# Patient Record
Sex: MALE | Race: WHITE | NOT HISPANIC OR LATINO | Employment: OTHER | URBAN - METROPOLITAN AREA
[De-identification: names, ages, dates, MRNs, and addresses within clinical notes are randomized per-mention and may not be internally consistent; named-entity substitution may affect disease eponyms.]

---

## 2019-12-09 ENCOUNTER — APPOINTMENT (EMERGENCY)
Dept: RADIOLOGY | Facility: HOSPITAL | Age: 69
End: 2019-12-09
Payer: MEDICARE

## 2019-12-09 ENCOUNTER — HOSPITAL ENCOUNTER (EMERGENCY)
Facility: HOSPITAL | Age: 69
Discharge: HOME | End: 2019-12-09
Attending: EMERGENCY MEDICINE
Payer: MEDICARE

## 2019-12-09 VITALS
SYSTOLIC BLOOD PRESSURE: 145 MMHG | DIASTOLIC BLOOD PRESSURE: 90 MMHG | WEIGHT: 298 LBS | HEART RATE: 78 BPM | OXYGEN SATURATION: 98 % | BODY MASS INDEX: 44.14 KG/M2 | TEMPERATURE: 98.1 F | RESPIRATION RATE: 18 BRPM | HEIGHT: 69 IN

## 2019-12-09 DIAGNOSIS — S91.115A: ICD-10-CM
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
## 2019-12-09 PROCEDURE — 12002 RPR S/N/AX/GEN/TRNK2.6-7.5CM: CPT | Performed by: EMERGENCY MEDICINE

## 2019-12-09 PROCEDURE — 73630 X-RAY EXAM OF FOOT: CPT

## 2019-12-09 PROCEDURE — 99283 EMERGENCY DEPT VISIT LOW MDM: CPT

## 2019-12-09 PROCEDURE — 99284 EMERGENCY DEPT VISIT MOD MDM: CPT | Performed by: EMERGENCY MEDICINE

## 2019-12-09 RX ORDER — LIDOCAINE HYDROCHLORIDE 20 MG/ML
10 INJECTION, SOLUTION EPIDURAL; INFILTRATION; INTRACAUDAL; PERINEURAL ONCE
Status: COMPLETED | OUTPATIENT
Start: 2019-12-09 | End: 2019-12-09

## 2019-12-09 RX ORDER — LIDOCAINE HYDROCHLORIDE 20 MG/ML
5 INJECTION, SOLUTION EPIDURAL; INFILTRATION; INTRACAUDAL; PERINEURAL ONCE
Status: COMPLETED | OUTPATIENT
Start: 2019-12-09 | End: 2019-12-09

## 2019-12-09 RX ORDER — CEPHALEXIN 250 MG/1
500 CAPSULE ORAL ONCE
Status: COMPLETED | OUTPATIENT
Start: 2019-12-09 | End: 2019-12-09

## 2019-12-09 RX ORDER — CEPHALEXIN 500 MG/1
500 CAPSULE ORAL 4 TIMES DAILY
Qty: 40 CAPSULE | Refills: 0 | Status: SHIPPED | OUTPATIENT
Start: 2019-12-09 | End: 2019-12-19

## 2019-12-09 RX ADMIN — LIDOCAINE HYDROCHLORIDE 5 ML: 20 INJECTION, SOLUTION EPIDURAL; INFILTRATION; INTRACAUDAL; PERINEURAL at 18:11

## 2019-12-09 RX ADMIN — CEPHALEXIN 500 MG: 250 CAPSULE ORAL at 20:06

## 2019-12-09 RX ADMIN — LIDOCAINE HYDROCHLORIDE 10 ML: 20 INJECTION, SOLUTION EPIDURAL; INFILTRATION; INTRACAUDAL; PERINEURAL at 19:42

## 2019-12-09 NOTE — ED PROVIDER NOTES
H&P Exam - Trauma   Sridevi Faria 77 y o  male MRN: [id number]  Unit/Bed#: FT 01/FT 01 Encounter: [id number]    Assessment/Plan   Trauma Alert:    Model of Arrival:   via    Trauma Team: Current Providers  Attending Provider: Alysia Del Toro DO  Registered Nurse: Lay Galarza, RN  Registered Nurse: Akash Vazquez, RN  Registered Nurse: Michael Guevara RN  Consultants: None    Trauma Active Problems: *Laceration to foot/L index finger**    Trauma Plan: X-Rays/Sutures    Chief Complaint:   Chief Complaint   Patient presents with    Fall     Patient c/o fall and has left foot laceration  and left index finger laceration  Patient denies any head injury  Patient denies loss of consciousness  Patient does not take blood thinners  History of Present Illness   HPI:  Sridevi Faria is a 77 y o  male who presents with chief complaint of cutting his foot after falling down carpeted stairs around 1:30 p m  Patient has a laceration on the plantar crease of his 4th & 5th toes  Pt  Also has a cut to his L index finger  Mechanism:           HPI  Review of Systems   Constitutional: Negative for diaphoresis, fatigue and fever  HENT: Negative for congestion, ear pain, nosebleeds and sore throat  Eyes: Negative for photophobia, pain, discharge and visual disturbance  Respiratory: Negative for cough, choking, chest tightness, shortness of breath and wheezing  Cardiovascular: Negative for chest pain and palpitations  Gastrointestinal: Negative for abdominal distention, abdominal pain, diarrhea and vomiting  Genitourinary: Negative for dysuria, flank pain and frequency  Musculoskeletal: Negative for arthralgias, back pain, gait problem, joint swelling, myalgias and neck pain  Skin: Positive for wound  Negative for color change and rash  Neurological: Negative for dizziness, syncope and headaches  Psychiatric/Behavioral: Negative for behavioral problems and confusion   The patient is not nervous/anxious  All other systems reviewed and are negative  Historical Information     Immunizations: There is no immunization history on file for this patient  Past Medical History:   Diagnosis Date    Cancer [facility])     lymphoma    Cardiac disease     pacemaker    Hyperlipidemia     Hypertension      History reviewed  No pertinent family history    Past Surgical History:   Procedure Laterality Date    CARDIAC PACEMAKER PLACEMENT         Social History     Socioeconomic History    Marital status: /Civil Union     Spouse name: None    Number of children: None    Years of education: None    Highest education level: None   Occupational History    None   Social Needs    Financial resource strain: None    Food insecurity:     Worry: None     Inability: None    Transportation needs:     Medical: None     Non-medical: None   Tobacco Use    Smoking status: Never Smoker    Smokeless tobacco: Never Used   Substance and Sexual Activity    Alcohol use: Never     Frequency: Never    Drug use: Never    Sexual activity: None   Lifestyle    Physical activity:     Days per week: None     Minutes per session: None    Stress: None   Relationships    Social connections:     Talks on phone: None     Gets together: None     Attends Mandaen service: None     Active member of club or organization: None     Attends meetings of clubs or organizations: None     Relationship status: None    Intimate partner violence:     Fear of current or ex partner: None     Emotionally abused: None     Physically abused: None     Forced sexual activity: None   Other Topics Concern    None   Social History Narrative    None       Family History: non-contributory    Meds/Allergies   None       No Known Allergies    PHYSICAL EXAM    PE limited by: nothing    Objective   Vitals:   First set: Temperature: 98 1 °F (36 7 °C) (12/09/19 1711)  Pulse: 70 (12/09/19 1711)  Respirations: 16 (12/09/19 1711)  Blood Pressure: 140/83 (12/09/19 1711)  SpO2: 100 % (12/09/19 1711)    Primary Survey:   (A) Airway: patent  (B) Breathing: CTA  (C) Circulation: Pulses:   normal  (D) Disabliity:  GCS Total:  15  (E) Expose:  Completed    Secondary Survey: (Click on Physical Exam tab above)  Physical Exam   Constitutional: He is oriented to person, place, and time  He appears well-developed and well-nourished  71-year-old male lying on the stretcher in no acute distress  HENT:   Head: Normocephalic and atraumatic  Eyes: Pupils are equal, round, and reactive to light  EOM are normal    Neck: Normal range of motion  Neck supple  Cardiovascular: Normal rate  Pulmonary/Chest: Effort normal    Abdominal: Soft  Bowel sounds are normal  There is no tenderness  Musculoskeletal: Normal range of motion  Left foot:  There is a 1 inch laceration to the plantar aspect of the 4th toe at the crease of the base of the toe to the ball of the foot     Pulses are intact  There is no tenderness to palpation  There is a one inch laceration to the 5th toe on the plantar aspect at the crease of the toe to the ball of the foot as well  L index finger:  Small avulsion to the distal tip of the skin; I offered pt  A suture there, but pt  Just wanted me to cut the skin and part of the nail off which I did  He declined sutures  Pt  Had full ROM and tendons were intact   Neurological: He is alert and oriented to person, place, and time  No cranial nerve deficit  He exhibits normal muscle tone  Coordination normal    Skin: Skin is warm and dry  There is a 1 inch laceration to the plantar aspect of the base of the 4th toe    There is a 1 inch lac to the plantar aspect of the base of the 5th toe    There is an avulsion lac to the distal L 2nd digit   Psychiatric: He has a normal mood and affect  Nursing note and vitals reviewed        Invasive Devices     None                 Lab Results:   Results Reviewed     None                 Imaging Studies:   Direct to CT: No  XR foot 3+ views LEFT   ED Interpretation by Hoang Oh DO (12/09 3740)   Neg for Fx          Other Studies: X-Rays  No fx  Code Status: No Order  Advance Directive and Living Will:      Power of :    POLST:      Procedures  Laceration repair  Date/Time: 12/9/2019 8:16 PM  Performed by: Hoang Oh DO  Authorized by: Hoang Oh DO   Consent: Verbal consent obtained  Consent given by: patient  Patient understanding: patient states understanding of the procedure being performed  Patient identity confirmed: verbally with patient  Body area: lower extremity  Location details: left foot  Laceration length: 5 cm  Foreign bodies: no foreign bodies  Tendon involvement: none  Nerve involvement: none  Vascular damage: no  Anesthesia: local infiltration    Anesthesia:  Local Anesthetic: lidocaine 2% without epinephrine  Anesthetic total: 10 mL    Sedation:  Patient sedated: no      Wound Dehiscence:    Secondary closure or dehiscence: complex    Procedure Details:  Irrigation solution: saline  Irrigation method: syringe  Amount of cleaning: standard  Debridement: none  Degree of undermining: none  Skin closure: 4-0 nylon  Number of sutures: 5  Technique: complex  Approximation: close  Approximation difficulty: complex  Dressing: non-adhesive packing strip, 4x4 sterile gauze and tube gauze  Patient tolerance: Patient tolerated the procedure well with no immediate complications  Comments: This was a very complex repair  Patient had 2 lacerations,  one at the crease of the plantar aspect of the 4th toe to the ball of the foot that was about an inch in length and also required( 3) - 4'0 Ethilon sutures  Patient also had a 1 inch laceration to the plantar aspect of the crease of the 5th toe which required 2 (4'-0) sutures     The lacerations were in a very awkward spot and it took about an hour to suture  JAQUELINE Jay assisted me with my repair                 ED Course MDM     Differential diagnosis includes:  1  Fall  2  Fall down stairs  3  Left foot laceration  4  Left toe laceration  5  Left 2nd digit injury  6  Left 2nd digit abrasion           Disposition  [facility] Transfer: No  Final diagnoses:   Fall   Laceration of fifth toe of left foot   Laceration of fourth toe, left, initial encounter     Time reflects when diagnosis was documented in both MDM as applicable and the Disposition within this note     Time User Action Codes Description Comment    12/9/2019  8:02 PM Luis M Acevedo Add [G63  XXXA] Fall     12/9/2019  8:03 PM Luis M Acevedo Add [R41 692M] Laceration of fifth toe of left foot     12/9/2019  8:04 PM Luis M Acevedo Add [S91 115A] Laceration of fourth toe, left, initial encounter       ED Disposition     ED Disposition Condition Date/Time Comment    Discharge Good Mon Dec 9, 2019  8:02 PM Jai Hudson discharge to home/self care  Follow-up Information     Follow up With Specialties Details Why Contact Info    Emergency [address]  In 12 days You have 3 sutures in your 4th toe and 2 sutures in the 5th toe         Discharge Medication List as of 12/9/2019  8:10 PM      START taking these medications    Details   cephalexin (KEFLEX) 500 mg capsule Take 1 capsule (500 mg total) by mouth 4 (four) times a day for 10 days, Starting Mon 12/9/2019, Until Thu 12/19/2019, Print           No discharge procedures on file        ED Provider  Electronically Signed by         Bharti Vincent DO  12/09/19 2031       Bharti Vincent,   12/09/19 2031

## 2019-12-10 NOTE — DISCHARGE INSTRUCTIONS
Keep clean and dry x 24 hours  2  Take your Keflex 4 times a day for 10 days  3  Elevate your foot as much as possible  4  Return immediately with any signs of infection, pus drainage, fever,chills, etc   5   Take Tylenol 650mg every 4 hours for pain  6  Suture removal in 12 days  7    Return if any problems

## 2019-12-12 NOTE — RESULT ENCOUNTER NOTE
Patient was notified of radiology read of fracture  He reports absolutely no pain anymore, reports feeling completely back to normal even w weight bearing  Did provide podiatry number for follow up as there is an obvious deformity in the bone  Unknown if prior old fx

## 2020-03-10 ENCOUNTER — APPOINTMENT (OUTPATIENT)
Dept: LAB | Facility: HOSPITAL | Age: 70
End: 2020-03-10
Payer: MEDICARE

## 2020-03-10 ENCOUNTER — TRANSCRIBE ORDERS (OUTPATIENT)
Dept: ADMINISTRATIVE | Facility: HOSPITAL | Age: 70
End: 2020-03-10

## 2020-03-10 DIAGNOSIS — R05.9 COUGH: ICD-10-CM

## 2020-03-10 DIAGNOSIS — R06.02 SHORTNESS OF BREATH: Primary | ICD-10-CM

## 2020-03-10 DIAGNOSIS — R06.02 SHORTNESS OF BREATH: ICD-10-CM

## 2020-03-10 LAB
BASOPHILS # BLD AUTO: 0.06 THOUSANDS/ΜL (ref 0–0.1)
BASOPHILS NFR BLD AUTO: 1 % (ref 0–1)
EOSINOPHIL # BLD AUTO: 0.12 THOUSAND/ΜL (ref 0–0.61)
EOSINOPHIL NFR BLD AUTO: 2 % (ref 0–6)
ERYTHROCYTE [DISTWIDTH] IN BLOOD BY AUTOMATED COUNT: 14 % (ref 11.6–15.1)
HCT VFR BLD AUTO: 47.2 % (ref 36.5–49.3)
HGB BLD-MCNC: 16 G/DL (ref 12–17)
IMM GRANULOCYTES # BLD AUTO: 0.06 THOUSAND/UL (ref 0–0.2)
IMM GRANULOCYTES NFR BLD AUTO: 1 % (ref 0–2)
LYMPHOCYTES # BLD AUTO: 1.48 THOUSANDS/ΜL (ref 0.6–4.47)
LYMPHOCYTES NFR BLD AUTO: 19 % (ref 14–44)
MCH RBC QN AUTO: 29.9 PG (ref 26.8–34.3)
MCHC RBC AUTO-ENTMCNC: 33.9 G/DL (ref 31.4–37.4)
MCV RBC AUTO: 88 FL (ref 82–98)
MONOCYTES # BLD AUTO: 0.46 THOUSAND/ΜL (ref 0.17–1.22)
MONOCYTES NFR BLD AUTO: 6 % (ref 4–12)
NEUTROPHILS # BLD AUTO: 5.62 THOUSANDS/ΜL (ref 1.85–7.62)
NEUTS SEG NFR BLD AUTO: 71 % (ref 43–75)
NRBC BLD AUTO-RTO: 0 /100 WBCS
PLATELET # BLD AUTO: 208 THOUSANDS/UL (ref 149–390)
PMV BLD AUTO: 12.5 FL (ref 8.9–12.7)
RBC # BLD AUTO: 5.35 MILLION/UL (ref 3.88–5.62)
WBC # BLD AUTO: 7.8 THOUSAND/UL (ref 4.31–10.16)

## 2020-03-10 PROCEDURE — 85025 COMPLETE CBC W/AUTO DIFF WBC: CPT

## 2020-03-10 PROCEDURE — 86003 ALLG SPEC IGE CRUDE XTRC EA: CPT

## 2020-03-10 PROCEDURE — 82785 ASSAY OF IGE: CPT

## 2020-03-10 PROCEDURE — 36415 COLL VENOUS BLD VENIPUNCTURE: CPT

## 2020-03-12 LAB
A ALTERNATA IGE QN: <0.1 KUA/I
A FUMIGATUS IGE QN: <0.1 KUA/I
ALLERGEN COMMENT: NORMAL
ALLERGEN COMMENT: NORMAL
ALMOND IGE QN: <0.1 KUA/I
BERMUDA GRASS IGE QN: <0.1 KUA/I
BOXELDER IGE QN: <0.1 KUA/I
C HERBARUM IGE QN: <0.1 KUA/I
CASHEW NUT IGE QN: <0.1 KUA/I
CAT DANDER IGE QN: <0.1 KUA/I
CMN PIGWEED IGE QN: <0.1 KUA/I
CODFISH IGE QN: <0.1 KUA/I
COMMON RAGWEED IGE QN: <0.1 KUA/I
COTTONWOOD IGE QN: <0.1 KUA/I
D FARINAE IGE QN: <0.1 KUA/I
D PTERONYSS IGE QN: <0.1 KUA/I
DOG DANDER IGE QN: <0.1 KUA/I
EGG WHITE IGE QN: <0.1 KUA/I
GLUTEN IGE QN: <0.1 KUA/I
HAZELNUT IGE QN: <0.1 KUA/L
LONDON PLANE IGE QN: <0.1 KUA/I
MILK IGE QN: <0.1 KUA/I
MOUSE URINE PROT IGE QN: <0.1 KUA/I
MT JUNIPER IGE QN: <0.1 KUA/I
MUGWORT IGE QN: <0.1 KUA/I
P NOTATUM IGE QN: <0.1 KUA/I
PEANUT IGE QN: <0.1 KUA/I
ROACH IGE QN: <0.1 KUA/I
SALMON IGE QN: <0.1 KUA/I
SCALLOP IGE QN: <0.1 KUA/L
SESAME SEED IGE QN: <0.1 KUA/I
SHEEP SORREL IGE QN: <0.1 KUA/I
SHRIMP IGE QN: <0.1 KUA/L
SILVER BIRCH IGE QN: <0.1 KUA/I
SOYBEAN IGE QN: <0.1 KUA/I
TIMOTHY IGE QN: <0.1 KUA/I
TOTAL IGE SMQN RAST: 2.86 KU/L (ref 0–113)
TOTAL IGE SMQN RAST: 2.99 KU/L (ref 0–113)
TUNA IGE QN: <0.1 KUA/I
WALNUT IGE QN: <0.1 KUA/I
WALNUT IGE QN: <0.1 KUA/I
WHEAT IGE QN: <0.1 KUA/I
WHITE ASH IGE QN: <0.1 KUA/I
WHITE ELM IGE QN: <0.1 KUA/I
WHITE MULBERRY IGE QN: <0.1 KUA/I
WHITE OAK IGE QN: <0.1 KUA/I

## 2021-03-10 DIAGNOSIS — Z23 ENCOUNTER FOR IMMUNIZATION: ICD-10-CM

## 2021-06-14 ENCOUNTER — HOSPITAL ENCOUNTER (EMERGENCY)
Facility: HOSPITAL | Age: 71
Discharge: HOME | End: 2021-06-14
Attending: EMERGENCY MEDICINE | Admitting: EMERGENCY MEDICINE
Payer: MEDICARE

## 2021-06-14 VITALS
RESPIRATION RATE: 18 BRPM | BODY MASS INDEX: 43.2 KG/M2 | SYSTOLIC BLOOD PRESSURE: 142 MMHG | WEIGHT: 291.67 LBS | TEMPERATURE: 98.5 F | OXYGEN SATURATION: 98 % | HEART RATE: 60 BPM | DIASTOLIC BLOOD PRESSURE: 86 MMHG | HEIGHT: 69 IN

## 2021-06-14 DIAGNOSIS — H60.90 OTITIS EXTERNA: Primary | ICD-10-CM

## 2021-06-14 PROCEDURE — 99283 EMERGENCY DEPT VISIT LOW MDM: CPT

## 2021-06-14 PROCEDURE — 99284 EMERGENCY DEPT VISIT MOD MDM: CPT | Performed by: EMERGENCY MEDICINE

## 2021-06-14 RX ORDER — NEOMYCIN SULFATE, POLYMYXIN B SULFATE AND HYDROCORTISONE 10; 3.5; 1 MG/ML; MG/ML; [USP'U]/ML
4 SUSPENSION/ DROPS AURICULAR (OTIC) ONCE
Status: COMPLETED | OUTPATIENT
Start: 2021-06-14 | End: 2021-06-14

## 2021-06-14 RX ADMIN — NEOMYCIN SULFATE, POLYMYXIN B SULFATE AND HYDROCORTISONE 4 DROP: 10; 3.5; 1 SUSPENSION/ DROPS AURICULAR (OTIC) at 11:08

## 2021-06-14 NOTE — DISCHARGE INSTRUCTIONS
Four drops of the solution should be instilled into the affected ear 3 times daily for the next 7 days  Lie with the affected ear upward and then the drops should be instilled  This position should be maintained for 5 minutes to facilitate penetration of the drops into the ear canal  Please follow up with ENT for recheck   No swimming until healed

## 2021-06-14 NOTE — ED PROVIDER NOTES
History  Chief Complaint   Patient presents with    Earache     per patient he has been having ear pain for one week on the right side  HPI  81 yo M presents with right ear pain for the past week  He states he was swimming in the pool frequently recently and has pain to the outside of ear which is aching and moderate  Has not noticed any drainage  States he has chronic ringing sensation in both ears and has not followed up for this  No dizziness or headache  None       Past Medical History:   Diagnosis Date    Cancer [facility])     lymphoma    Cardiac disease     pacemaker    Hyperlipidemia     Hypertension        Past Surgical History:   Procedure Laterality Date    CARDIAC PACEMAKER PLACEMENT         History reviewed  No pertinent family history  I have reviewed and agree with the history as documented  E-Cigarette/Vaping     E-Cigarette/Vaping Substances     Social History     Tobacco Use    Smoking status: Never Smoker    Smokeless tobacco: Never Used   Substance Use Topics    Alcohol use: Never    Drug use: Never       Review of Systems   Constitutional: Negative for chills and fever  HENT: Positive for ear pain  Negative for dental problem  Eyes: Negative for pain and redness  Respiratory: Negative for cough and shortness of breath  Cardiovascular: Negative for chest pain and palpitations  Gastrointestinal: Negative for abdominal pain and nausea  Endocrine: Negative for polydipsia and polyphagia  Genitourinary: Negative for dysuria and frequency  Musculoskeletal: Negative for arthralgias and joint swelling  Skin: Negative for color change and rash  Neurological: Negative for dizziness and headaches  Psychiatric/Behavioral: Negative for behavioral problems and confusion  All other systems reviewed and are negative  Physical Exam  Physical Exam  Vitals and nursing note reviewed  Constitutional:       General: He is not in acute distress    HENT:      Head: Normocephalic and atraumatic  Comments: +tenderness with manipulation of pinna on R, mild edema of canal, TM clear     Left Ear: External ear normal       Nose: Nose normal    Eyes:      General: No scleral icterus  Cardiovascular:      Rate and Rhythm: Normal rate  Pulmonary:      Effort: Pulmonary effort is normal  No respiratory distress  Abdominal:      General: There is no distension  Musculoskeletal:         General: No deformity  Normal range of motion  Skin:     Findings: No rash  Neurological:      General: No focal deficit present  Mental Status: He is alert and oriented to person, place, and time  Cranial Nerves: No cranial nerve deficit  Sensory: No sensory deficit  Motor: No weakness  Gait: Gait normal    Psychiatric:         Mood and Affect: Mood normal          Vital Signs  ED Triage Vitals [06/14/21 1050]   Temperature Pulse Respirations Blood Pressure SpO2   98 5 °F (36 9 °C) 61 18 170/93 99 %      Temp Source Heart Rate Source Patient Position - Orthostatic VS BP Location FiO2 (%)   Oral Monitor Lying Right arm --      Pain Score       7           Vitals:    06/14/21 1050 06/14/21 1100   BP: 170/93 142/86   Pulse: 61 60   Patient Position - Orthostatic VS: Lying Lying         Visual Acuity      ED Medications  Medications   neomycin-polymyxin-hydrocortisone (CORTISPORIN) 0 35%-10,000 units/mL-1% otic suspension 4 drop (4 drops Right Ear Given 6/14/21 1108)       Diagnostic Studies  Results Reviewed     None                 No orders to display              Procedures  Procedures         ED Course                             SBIRT 22yo+      Most Recent Value   SBIRT (24 yo +)   In order to provide better care to our patients, we are screening all of our patients for alcohol and drug use  Would it be okay to ask you these screening questions? No Filed at: 06/14/2021 1105                    MDM  81 yo M presents with otitis externa from swimming   No signs of necrotizing infection or mastoiditis  Will rx antibiotic drops and follow up with ENT  Disposition  Final diagnoses:   Otitis externa     Time reflects when diagnosis was documented in both MDM as applicable and the Disposition within this note     Time User Action Codes Description Comment    6/14/2021 11:03 AM Dario Bello Add [H60 90] Otitis externa       ED Disposition     ED Disposition Condition Date/Time Comment    Discharge Stable Mon Jun 14, 2021 11:03 AM Graham Im Refai discharge to home/self care  Follow-up Information     Follow up With Specialties Details Why Contact Info    Daniela Eugene MD Otolaryngology Schedule an appointment as soon as possible for a visit   [address] [place] [id number]  [phone]            Patient's Medications    No medications on file     No discharge procedures on file      PDMP Review     None          ED Provider  Electronically Signed by           Tia Noguera MD  06/14/21 7240

## 2022-03-05 ENCOUNTER — HOSPITAL ENCOUNTER (EMERGENCY)
Facility: HOSPITAL | Age: 72
Discharge: HOME | End: 2022-03-05
Attending: EMERGENCY MEDICINE | Admitting: EMERGENCY MEDICINE
Payer: MEDICARE

## 2022-03-05 ENCOUNTER — APPOINTMENT (EMERGENCY)
Dept: VASCULAR ULTRASOUND | Facility: HOSPITAL | Age: 72
End: 2022-03-05
Payer: MEDICARE

## 2022-03-05 ENCOUNTER — APPOINTMENT (EMERGENCY)
Dept: RADIOLOGY | Facility: HOSPITAL | Age: 72
End: 2022-03-05
Payer: MEDICARE

## 2022-03-05 VITALS
SYSTOLIC BLOOD PRESSURE: 121 MMHG | HEART RATE: 60 BPM | BODY MASS INDEX: 42.58 KG/M2 | DIASTOLIC BLOOD PRESSURE: 73 MMHG | TEMPERATURE: 98.8 F | OXYGEN SATURATION: 97 % | RESPIRATION RATE: 17 BRPM | WEIGHT: 287.48 LBS | HEIGHT: 69 IN

## 2022-03-05 DIAGNOSIS — M79.89 SWELLING OF CALF: Primary | ICD-10-CM

## 2022-03-05 DIAGNOSIS — M79.672 LEFT FOOT PAIN: ICD-10-CM

## 2022-03-05 LAB
ATRIAL RATE: 192 BPM
QRS AXIS: -40 DEGREES
QRSD INTERVAL: 146 MS
QT INTERVAL: 448 MS
QTC INTERVAL: 448 MS
T WAVE AXIS: 19 DEGREES
VENTRICULAR RATE: 60 BPM

## 2022-03-05 PROCEDURE — 99284 EMERGENCY DEPT VISIT MOD MDM: CPT

## 2022-03-05 PROCEDURE — 93971 EXTREMITY STUDY: CPT

## 2022-03-05 PROCEDURE — 93005 ELECTROCARDIOGRAM TRACING: CPT

## 2022-03-05 PROCEDURE — 93971 EXTREMITY STUDY: CPT | Performed by: SURGERY

## 2022-03-05 PROCEDURE — 99284 EMERGENCY DEPT VISIT MOD MDM: CPT | Performed by: PHYSICIAN ASSISTANT

## 2022-03-05 PROCEDURE — 73630 X-RAY EXAM OF FOOT: CPT

## 2022-03-05 PROCEDURE — 93010 ELECTROCARDIOGRAM REPORT: CPT | Performed by: INTERNAL MEDICINE

## 2022-03-05 NOTE — DISCHARGE INSTRUCTIONS
Elevate your leg and wear compression stockings to help with swelling  Please follow-up with podiatry and your family doctor  Return to the ER with any worsening symptoms

## 2022-03-05 NOTE — ED PROVIDER NOTES
History  Chief Complaint   Patient presents with    Leg Swelling     Patient co left leg/calf swelling  Symptoms started 3 months ago  68yo male with a history of lymphoma currently in remission, atrial flutter, pacemaker, hypertension, hyperlipidemia, and type 2 diabetes presenting for evaluation of multiple complaints  First, he complains of left calf swelling that has been present for the past month  He denies any injuries but states his left calf feels larger than the right with associated fullness  He denies any calf pain  Patient also reports pain in his left 4th and 5th metatarsal region that has been persistent for the past 3 months since a fall  Additionally, patient has a cracked skin at his left heel that causes pain with ambulation  Patient denies any chest pain or shortness of breath  He is otherwise asymptomatic  History provided by:  Patient   used: No    Medical Problem  Location:  Left calf  Quality:  Swelling  Severity:  Moderate  Onset quality:  Gradual  Duration:  1 month  Timing:  Constant  Progression:  Unchanged  Chronicity:  New  Context:  No injuries  Relieved by:  Nothing  Worsened by:  Nothing  Associated symptoms: no abdominal pain, no chest pain, no fever, no loss of consciousness, no rash, no shortness of breath and no vomiting        None       Past Medical History:   Diagnosis Date    Cancer [facility])     lymphoma    Cardiac disease     pacemaker    Hyperlipidemia     Hypertension        Past Surgical History:   Procedure Laterality Date    CARDIAC PACEMAKER PLACEMENT         History reviewed  No pertinent family history  I have reviewed and agree with the history as documented      E-Cigarette/Vaping     E-Cigarette/Vaping Substances     Social History     Tobacco Use    Smoking status: Never Smoker    Smokeless tobacco: Never Used   Substance Use Topics    Alcohol use: Never    Drug use: Never       Review of Systems   Constitutional: Negative for chills and fever  HENT: Negative for drooling and voice change  Eyes: Negative for discharge and redness  Respiratory: Negative for shortness of breath and stridor  Cardiovascular: Positive for leg swelling  Negative for chest pain  Gastrointestinal: Negative for abdominal pain and vomiting  Musculoskeletal: Positive for arthralgias  Negative for neck pain and neck stiffness  Skin: Positive for wound  Negative for color change and rash  Neurological: Negative for seizures, loss of consciousness and syncope  Psychiatric/Behavioral: Negative for confusion  The patient is not nervous/anxious  All other systems reviewed and are negative  Physical Exam  Physical Exam  Vitals and nursing note reviewed  Constitutional:       General: He is not in acute distress  Appearance: Normal appearance  He is not toxic-appearing  HENT:      Head: Normocephalic and atraumatic  Right Ear: External ear normal       Left Ear: External ear normal    Eyes:      General: No scleral icterus  Right eye: No discharge  Left eye: No discharge  Conjunctiva/sclera: Conjunctivae normal    Cardiovascular:      Rate and Rhythm: Normal rate and regular rhythm  Pulses:           Dorsalis pedis pulses are 2+ on the left side  Pulmonary:      Effort: Pulmonary effort is normal  No respiratory distress  Breath sounds: Normal breath sounds  No stridor  No wheezing or rales  Musculoskeletal:         General: No deformity  Normal range of motion  Cervical back: Normal range of motion  Feet:       Comments: Left lower leg: There is a palpable fullness to the left calf  No pitting edema  No bony tenderness in lower leg  No skin changes  2+ DP pulse  Neurological:      General: No focal deficit present  Mental Status: He is alert  Mental status is at baseline     Psychiatric:         Mood and Affect: Mood normal          Behavior: Behavior normal          Vital Signs  ED Triage Vitals   Temperature Pulse Respirations Blood Pressure SpO2   03/05/22 0815 03/05/22 0815 03/05/22 0815 03/05/22 0815 03/05/22 0815   98 8 °F (37 1 °C) 64 21 (!) 188/86 99 %      Temp Source Heart Rate Source Patient Position - Orthostatic VS BP Location FiO2 (%)   03/05/22 0815 03/05/22 0815 03/05/22 0815 03/05/22 0815 --   Oral Monitor Sitting Right arm       Pain Score       03/05/22 1015       No Pain           Vitals:    03/05/22 0815 03/05/22 1015   BP: (!) 188/86 121/73   Pulse: 64 60   Patient Position - Orthostatic VS: Sitting          Visual Acuity      ED Medications  Medications - No data to display    Diagnostic Studies  Results Reviewed     None                 XR foot 3+ views LEFT   ED Interpretation by Nadeen Lai PA-C (03/05 2891)   No acute fracture      Final Result by Alisa Medina MD (03/05 1010)      No acute osseous abnormality  Workstation performed: [id number]         VAS lower limb venous duplex study, unilateral/limited    (Results Pending)              Procedures  Procedures         ED Course  ED Course as of 03/05/22 1052   Sat Mar 05, 2022   0827 Vascular tech notified of venous duplex order  1007 Informed by vascular tech that venous duplex is negative for DVT  Identification of Seniors at Risk      Most Recent Value   (ISAR) Identification of Seniors at Risk    Before the illness or injury that brought you to the Emergency, did you need someone to help you on a regular basis? 0 Filed at: 03/05/2022 0814   In the last 24 hours, have you needed more help than usual? 0 Filed at: 03/05/2022 6465   Have you been hospitalized for one or more nights during the past 6 months? 0 Filed at: 03/05/2022 9772   In general, do you see well? 0 Filed at: 03/05/2022 3125   In general, do you have serious problems with your memory? 0 Filed at: 03/05/2022 0565   Do you take more than three different medications every day?  1 Filed at: 03/05/2022 9122 ISAR Score 1 Filed at: 03/05/2022 6311                          [facility]  Number of Diagnoses or Management Options  Left foot pain: new and requires workup  Swelling of calf: new and requires workup  Diagnosis management comments: 68yo male presenting for left calf swelling x 1 month as well as left foot pain x 3 months after a fall  No CP/SOB  He is hypertensive on arrival with otherwise normal vital signs  On exam, he does have a fullness in his left calf without pitting edema  LLE is neurovascularly intact  X-rays of left foot obtained which are negative for fracture  Venous duplex obtained which is negative for DVT  No indication for further workup at this time  Advised elevation and compression stockings  Advised close PCP and podiatry follow-up  ED return precautions discussed  Patient expressed understanding and is agreeable to plan  Patient discharged in stable condition  Amount and/or Complexity of Data Reviewed  Tests in the radiology section of CPT®: reviewed and ordered  Discussion of test results with the performing providers: yes  Independent visualization of images, tracings, or specimens: yes    Risk of Complications, Morbidity, and/or Mortality  Presenting problems: moderate  Diagnostic procedures: low  Management options: low    Patient Progress  Patient progress: stable      Disposition  Final diagnoses:   Swelling of calf   Left foot pain     Time reflects when diagnosis was documented in both MDM as applicable and the Disposition within this note     Time User Action Codes Description Comment    3/5/2022 10:13 AM [place], [address] [employer] Add [M79 89] Swelling of calf     3/5/2022 10:13 AM Matthew, [address] [employer] Oren Add [V67 253] Left foot pain       ED Disposition     ED Disposition Condition Date/Time Comment    Discharge Stable Sat Mar 5, 2022 10:12 AM Jean Claude Ear Refai discharge to home/self care              Follow-up Information     Follow up With Specialties Details Why Contact Info Additional [address] [place] Nadeem Mix DPM Podiatry Schedule an appointment as soon as possible for a visit   [address]  [address]       5324 [facility] Emergency Department Emergency Medicine  If symptoms worsen 34 [facility] 109 [facility] Emergency Department, [address] [place] [address], [facility]          There are no discharge medications for this patient  No discharge procedures on file      PDMP Review     None          ED Provider  Electronically Signed by           Gabriela Seymour PA-C  03/05/22 9013

## 2022-08-29 ENCOUNTER — HOSPITAL ENCOUNTER (EMERGENCY)
Facility: HOSPITAL | Age: 72
Discharge: HOME | End: 2022-08-29
Attending: EMERGENCY MEDICINE
Payer: MEDICARE

## 2022-08-29 ENCOUNTER — APPOINTMENT (OUTPATIENT)
Dept: RADIOLOGY | Facility: HOSPITAL | Age: 72
End: 2022-08-29
Payer: MEDICARE

## 2022-08-29 VITALS
HEART RATE: 78 BPM | DIASTOLIC BLOOD PRESSURE: 74 MMHG | SYSTOLIC BLOOD PRESSURE: 153 MMHG | RESPIRATION RATE: 18 BRPM | TEMPERATURE: 97.7 F | OXYGEN SATURATION: 99 %

## 2022-08-29 DIAGNOSIS — M79.671 RIGHT FOOT PAIN: Primary | ICD-10-CM

## 2022-08-29 PROCEDURE — 73620 X-RAY EXAM OF FOOT: CPT

## 2022-08-29 PROCEDURE — 99282 EMERGENCY DEPT VISIT SF MDM: CPT | Performed by: PHYSICIAN ASSISTANT

## 2022-08-29 PROCEDURE — 99283 EMERGENCY DEPT VISIT LOW MDM: CPT

## 2022-08-29 NOTE — ED PROVIDER NOTES
History  Chief Complaint   Patient presents with    Foreign Body in Skin     Patient co right foot pain  Patient states " I think there is either glass or wood under the skin on the bottom of my foot " Started 1 month ago  70 yo with right foot pain for one month  Gradual  Sole of right foot  Only has pain with weight bearing  No pain while resting  No rash/color change  Denies fever or chills  No numbness, tingling, or weakness  He thought there could be a foreign body in the skin but doesn't actually recall an incident where this happened  History provided by:  Patient   used: No    Leg Pain  Location:  Foot  Time since incident:  1 month  Injury: no    Foot location:  Sole of R foot  Pain details:     Quality:  Aching    Radiates to:  Does not radiate    Severity:  Mild    Onset quality:  Gradual    Duration:  1 month    Timing:  Intermittent    Progression:  Unchanged  Chronicity:  New  Dislocation: no    Relieved by:  Nothing  Worsened by:  Nothing  Ineffective treatments:  None tried  Associated symptoms: no back pain and no fever        None       Past Medical History:   Diagnosis Date    Cancer ([facility] [place] [address] )     lymphoma    Cardiac disease     pacemaker    Hyperlipidemia     Hypertension        Past Surgical History:   Procedure Laterality Date    CARDIAC PACEMAKER PLACEMENT         History reviewed  No pertinent family history  I have reviewed and agree with the history as documented  E-Cigarette/Vaping    E-Cigarette Use Never User      E-Cigarette/Vaping Substances     Social History     Tobacco Use    Smoking status: Never Smoker    Smokeless tobacco: Never Used   Vaping Use    Vaping Use: Never used   Substance Use Topics    Alcohol use: Never    Drug use: Never       Review of Systems   Constitutional: Negative for chills and fever  HENT: Negative for ear pain and sore throat  Eyes: Negative for pain and visual disturbance     Respiratory: Negative for cough and shortness of breath  Cardiovascular: Negative for chest pain and palpitations  Gastrointestinal: Negative for abdominal pain and vomiting  Genitourinary: Negative for dysuria and hematuria  Musculoskeletal: Negative for arthralgias and back pain  Skin: Negative for color change and rash  Neurological: Negative for seizures and syncope  All other systems reviewed and are negative  Physical Exam  Physical Exam  Vitals and nursing note reviewed  Constitutional:       Appearance: He is well-developed  HENT:      Head: Normocephalic and atraumatic  Eyes:      Conjunctiva/sclera: Conjunctivae normal    Cardiovascular:      Rate and Rhythm: Normal rate and regular rhythm  Heart sounds: No murmur heard  Pulmonary:      Effort: Pulmonary effort is normal  No respiratory distress  Breath sounds: Normal breath sounds  Abdominal:      Palpations: Abdomen is soft  Tenderness: There is no abdominal tenderness  Musculoskeletal:      Cervical back: Neck supple  Feet:    Skin:     General: Skin is warm and dry  Neurological:      Mental Status: He is alert           Vital Signs  ED Triage Vitals [08/29/22 1332]   Temperature Pulse Respirations Blood Pressure SpO2   97 7 °F (36 5 °C) 78 18 153/74 99 %      Temp Source Heart Rate Source Patient Position - Orthostatic VS BP Location FiO2 (%)   Tympanic Monitor Sitting Left arm --      Pain Score       --           Vitals:    08/29/22 1332   BP: 153/74   Pulse: 78   Patient Position - Orthostatic VS: Sitting         Visual Acuity      ED Medications  Medications - No data to display    Diagnostic Studies  Results Reviewed     None                 XR foot 2 views RIGHT    (Results Pending)              Procedures  Procedures         ED Course                               SBIRT 22yo+    Flowsheet Row Most Recent Value   SBIRT (25 yo +)    In order to provide better care to our patients, we are screening all of our patients for alcohol and drug use  Would it be okay to ask you these screening questions? Yes Filed at: 08/29/2022 1437   Initial Alcohol Screen: US AUDIT-C     1  How often do you have a drink containing alcohol? 0 Filed at: 08/29/2022 1437   2  How many drinks containing alcohol do you have on a typical day you are drinking? 0 Filed at: 08/29/2022 1437   3a  Male UNDER 65: How often do you have five or more drinks on one occasion? 0 Filed at: 08/29/2022 1437   3b  FEMALE Any Age, or MALE 65+: How often do you have 4 or more drinks on one occassion? 0 Filed at: 08/29/2022 1437   Audit-C Score 0 Filed at: 08/29/2022 1437   JOSE: How many times in the past year have you    Used an illegal drug or used a prescription medication for non-medical reasons? Never Filed at: 08/29/2022 1437                    MDM  Number of Diagnoses or Management Options  Right foot pain: new and requires workup  Diagnosis management comments: ddx includes but is not limited to FB, tendinitis, fasciitis, strain, sprain  Plan: XR       Amount and/or Complexity of Data Reviewed  Tests in the radiology section of CPT®: ordered and reviewed  Independent visualization of images, tracings, or specimens: yes    Risk of Complications, Morbidity, and/or Mortality  Presenting problems: low  Management options: low  General comments: 70 yo with right foot pain  XR negative  Pt understands this does not r/o FB  However with nothing visible or palpable and pt have no recollection of a FB entering the skin - alternative diagnosis is more likely  Recommended podiatry f/u  RICE  Warm soaks  Return parameters provided  Pt understands and agrees with plan        Patient Progress  Patient progress: stable      Disposition  Final diagnoses:   Right foot pain     Time reflects when diagnosis was documented in both MDM as applicable and the Disposition within this note     Time User Action Codes Description Comment    8/29/2022  2:53 PM Alvarado Crane Add [M79 5] Foreign body (FB) in soft tissue     8/29/2022  2:53 PM Horacio Lopez Remove [M79 5] Foreign body (FB) in soft tissue     8/29/2022  2:53 PM Horacio Lopez Add [I36 466] Right foot pain       ED Disposition     ED Disposition   Discharge    Condition   Stable    Date/Time   Mon Aug 29, 2022 1453    Comment   Reece Adrian Hudson discharge to home/self care  Follow-up Information     Follow up With Specialties Details Why Contact Info     Call   [address]  [address]  [phone]            Patient's Medications    No medications on file       No discharge procedures on file      PDMP Review     None          ED Provider  Electronically Signed by           Sid Johnson PA-C  08/29/22 1158

## 2023-03-01 ENCOUNTER — APPOINTMENT (EMERGENCY)
Dept: RADIOLOGY | Facility: HOSPITAL | Age: 73
End: 2023-03-01

## 2023-03-01 ENCOUNTER — HOSPITAL ENCOUNTER (EMERGENCY)
Facility: HOSPITAL | Age: 73
Discharge: HOME | End: 2023-03-01
Attending: EMERGENCY MEDICINE

## 2023-03-01 VITALS
TEMPERATURE: 97.8 F | DIASTOLIC BLOOD PRESSURE: 74 MMHG | SYSTOLIC BLOOD PRESSURE: 129 MMHG | RESPIRATION RATE: 18 BRPM | OXYGEN SATURATION: 98 % | HEART RATE: 73 BPM

## 2023-03-01 DIAGNOSIS — S92.314A CLOSED NONDISPLACED FRACTURE OF FIRST METATARSAL BONE OF RIGHT FOOT, INITIAL ENCOUNTER: Primary | ICD-10-CM

## 2023-03-01 LAB
BASOPHILS # BLD AUTO: 0.07 THOUSANDS/ÂΜL (ref 0–0.1)
BASOPHILS NFR BLD AUTO: 1 % (ref 0–1)
CRP SERPL QL: 9.1 MG/L
EOSINOPHIL # BLD AUTO: 0.12 THOUSAND/ÂΜL (ref 0–0.61)
EOSINOPHIL NFR BLD AUTO: 1 % (ref 0–6)
ERYTHROCYTE [DISTWIDTH] IN BLOOD BY AUTOMATED COUNT: 14.4 % (ref 11.6–15.1)
HCT VFR BLD AUTO: 50.3 % (ref 36.5–49.3)
HGB BLD-MCNC: 16.4 G/DL (ref 12–17)
IMM GRANULOCYTES # BLD AUTO: 0.06 THOUSAND/UL (ref 0–0.2)
IMM GRANULOCYTES NFR BLD AUTO: 1 % (ref 0–2)
LYMPHOCYTES # BLD AUTO: 2 THOUSANDS/ÂΜL (ref 0.6–4.47)
LYMPHOCYTES NFR BLD AUTO: 24 % (ref 14–44)
MCH RBC QN AUTO: 29.2 PG (ref 26.8–34.3)
MCHC RBC AUTO-ENTMCNC: 32.6 G/DL (ref 31.4–37.4)
MCV RBC AUTO: 90 FL (ref 82–98)
MONOCYTES # BLD AUTO: 0.65 THOUSAND/ÂΜL (ref 0.17–1.22)
MONOCYTES NFR BLD AUTO: 8 % (ref 4–12)
NEUTROPHILS # BLD AUTO: 5.6 THOUSANDS/ÂΜL (ref 1.85–7.62)
NEUTS SEG NFR BLD AUTO: 65 % (ref 43–75)
NRBC BLD AUTO-RTO: 0 /100 WBCS
PLATELET # BLD AUTO: 168 THOUSANDS/UL (ref 149–390)
PMV BLD AUTO: 11.2 FL (ref 8.9–12.7)
RBC # BLD AUTO: 5.62 MILLION/UL (ref 3.88–5.62)
URATE SERPL-MCNC: 8.5 MG/DL (ref 3.5–8.5)
WBC # BLD AUTO: 8.5 THOUSAND/UL (ref 4.31–10.16)

## 2023-03-01 RX ORDER — OXYCODONE HYDROCHLORIDE 5 MG/1
5 TABLET ORAL EVERY 6 HOURS PRN
Qty: 10 TABLET | Refills: 0 | Status: SHIPPED | OUTPATIENT
Start: 2023-03-01

## 2023-03-01 NOTE — PHYSICAL THERAPY NOTE
Physical Therapy Evaluation     Patient's Name: Golden Sims    Admitting Diagnosis  Foot pain [M79 673]    Problem List  There is no problem list on file for this patient  Past Medical History  Past Medical History:   Diagnosis Date    Cancer [facility])     lymphoma    Cardiac disease     pacemaker    Hyperlipidemia     Hypertension        Past Surgical History  Past Surgical History:   Procedure Laterality Date    CARDIAC PACEMAKER PLACEMENT          03/01/23 1525   PT Last Visit   PT Visit Date 03/01/23   Note Type   Note type Evaluation   Pain Assessment   Pain Assessment Tool 0-10   Pain Score No Pain   Restrictions/Precautions   Weight Bearing Precautions Per Order No   Braces or Orthoses CAM Boot   Other Precautions Fall Risk   Home Living   Type of [address] One level;Performs ADLs on one level; Able to live on main level with bedroom/bathroom  (1 TRUPTI with HR)   Bathroom Shower/Tub Walk-in shower   Bathroom Toilet Standard   Bathroom Equipment Grab bars in [address]   Additional Comments SPC in community   Prior Function   Level of [address] with functional mobility; Independent with ADLs; Independent with IADLS   Lives With Spouse;Son  (grandchildren)   Receives Help From Family   IADLs Independent with driving; Independent with meal prep; Independent with medication management   Falls in the last 6 months 0   Vocational Retired   General   Family/Caregiver Present No   Cognition   Overall Cognitive Status WFL   Arousal/Participation Alert   Orientation Level Oriented X4   Memory Within functional limits   Following Commands Follows all commands and directions without difficulty   Comments Pt agreeable to PT   RLE Assessment   RLE Assessment WFL   LLE Assessment   LLE Assessment WFL   Light Touch   RLE Light Touch Grossly intact   LLE Light Touch Grossly intact   Bed Mobility   Supine to Sit 6  Modified independent   Additional items HOB elevated; Bedrails Transfers   Sit to Stand 6  Modified independent   Additional items Armrests   Stand to Sit 6  Modified independent   Additional items Armrests   Additional Comments with use of SPC   Ambulation/Elevation   Gait pattern Improper Weight shift;Decreased foot clearance;Decreased R stance; Short stride   Gait Assistance 5  Supervision   Additional items Assist x 1;Verbal cues   Assistive Device [facility]   Distance 180'   Stair Management Assistance Not tested   Balance   Static Sitting Normal   Dynamic Sitting Good   Static Standing Fair +   Dynamic Standing Fair +   Ambulatory Fair   Activity Tolerance   Activity Tolerance Patient tolerated treatment well   Medical Staff Made Aware PT Raheem Shrestha   Nurse Made Aware PA April   Assessment   Prognosis Excellent   Problem List Impaired balance;Decreased mobility   Assessment Pt is 71 y o  male seen for PT evaluation s/p admit [facility] on 3/1/2023 w/ <principal problem not specified>  PT consulted to assess pt's functional mobility and d/c needs  Order placed for PT eval and tx, w/ up w/ A order  Comorbidities affecting pt's physical performance at time of assessment include: HLD, HTN, lymphoma, R foot pain  PTA, pt was independent w/ all functional mobility w/ SPC, ambulates community distances and elevations, ambulates household distances, has 1 TRUPTI, lives w/ spouse in one level house and retired  Personal factors affecting pt at time of IE include: ambulating w/ assistive device, stairs to enter home and unable to perform dynamic tasks in community  Please find objective findings from PT assessment regarding body systems outlined above  The following objective measures performed on IE: Barthel Index: 90/100, Modified Alan: 1 (no significant disability) and AM-PAC 6-Clicks: 07/51  Pt's clinical presentation is currently stable  seen in pt's presentation of continued need for medical management and monitoring, impaired balance   From PT/mobility standpoint, pt appears to be functioning close to or at mobility baseline, therefore no further immediate skilled PT needs are warranted at this time  Pt currently performing all phases of functional mobility at independent level with use of SPC safely w/ good carryover of such  Recommend pt continue to mobilize ad dereck while here  Recommend pt return to previous living environment once medically cleared and with continued family support  Will sign off, D/C PT  Please reconsult if further immediate skilled PT needs are warranted     Barriers to Discharge None   Recommendation   PT Discharge Recommendation No rehabilitation needs   AM-PAC Basic Mobility Inpatient   Turning in Flat Bed Without Bedrails 4   Lying on Back to Sitting on Edge of Flat Bed Without Bedrails 4   Moving Bed to Chair 4   Standing Up From Chair Using Arms 4   Walk in Room 3   Climb 3-5 Stairs With Railing 3   Basic Mobility Inpatient Raw Score 22   Basic Mobility Standardized Score 47 4   Highest Level Of Mobility   JH-HLM Goal 7: Walk 25 feet or more   JH-HLM Achieved 7: Walk 25 feet or more   Modified Prowers Scale   Modified Prowers Scale 1   Barthel Index   Feeding 10   Bathing 0   Grooming Score 5   Dressing Score 10   Bladder Score 10   Bowels Score 10   Toilet Use Score 10   Transfers (Bed/Chair) Score 15   Mobility (Level Surface) Score 15   Stairs Score 5   Barthel Index Score 90       Aide Lara, PT

## 2023-03-01 NOTE — DISCHARGE INSTRUCTIONS
You have a nondisplaced fracture of the joint just below your big toe  Follow-up with orthopedics for evaluation and further treatment  We have given you a walking boot to use  Make sure that the boot is not rubbing the skin and check your leg and foot often for skin breakdown  With having diabetes you may have decreased sensation and ability to notice this  I have sent oxycodone to your pharmacy  You can take 1 tablet every 6 hours as needed for moderate to severe pain  This medication can make you feel unsteady and sleepy  Do not drive while using it and make sure you move slowly to prevent falls  You may find it helpful to use this medication only at night to help you sleep  You can use Tylenol during the day  We recommend Tylenol and not Advil/ibuprofen because you are on a blood thinner called Eliquis  Elevate the foot on pillows, you can use ice for 20 minutes every hour  Wear the walking boot anytime you are up and walking  You should sit to shower to prevent putting weight on that foot

## 2023-03-01 NOTE — ED PROVIDER NOTES
History  Chief Complaint   Patient presents with   • Foot Pain     R foot pain and swelling  Denies any trauma  Ambulatory into triage  Patient is a 13-year-old male past medical history of cardiac disease with pacemaker, hyperlipidemia, hypertension, and lymphoma in remission who presents today with right foot pain  Patient states he drove up to [place] to visit family and drove home the same day 5 days ago  The pain and swelling started after that  It is worse at night, helped with Advil and rest, worse with ambulation  He has had increased pain and swelling of the foot causing him to limp, but he can still ambulate as needed  Denies a history of gout or arthritis  Denies tick or insect bites  States his shoes fit well  Denies any trauma such as twisting his ankle, tripping, or having something strike his foot/stubbing his toe  No redness or pain extending into the right leg  His left foot has no issues  Patient presents stating he would like x-rays and evaluation  None       Past Medical History:   Diagnosis Date   • Cancer [facility])     lymphoma   • Cardiac disease     pacemaker   • Hyperlipidemia    • Hypertension        Past Surgical History:   Procedure Laterality Date   • CARDIAC PACEMAKER PLACEMENT         History reviewed  No pertinent family history  I have reviewed and agree with the history as documented  E-Cigarette/Vaping   • E-Cigarette Use Never User      E-Cigarette/Vaping Substances     Social History     Tobacco Use   • Smoking status: Never   • Smokeless tobacco: Never   Vaping Use   • Vaping Use: Never used   Substance Use Topics   • Alcohol use: Never   • Drug use: Never       Review of Systems   Constitutional: Positive for activity change (Right foot pain)  Negative for chills, diaphoresis and fever  Respiratory: Negative for shortness of breath  Cardiovascular: Negative for chest pain  Gastrointestinal: Negative for abdominal pain, nausea and vomiting  Genitourinary: Negative for dysuria  Musculoskeletal: Positive for joint swelling (Right first MCP joint, right ankle)  Negative for gait problem and myalgias  Right foot pain  Skin: Negative for rash and wound  Neurological: Negative for syncope and facial asymmetry  All other systems reviewed and are negative  Physical Exam  Physical Exam  Vitals and nursing note reviewed  Constitutional:       General: He is awake  He is not in acute distress  Appearance: Normal appearance  He is not toxic-appearing or diaphoretic  HENT:      Head: Normocephalic and atraumatic  Right Ear: Hearing and external ear normal       Left Ear: Hearing and external ear normal       Nose: Nose normal       Mouth/Throat:      Lips: Pink  Eyes:      General: Lids are normal  No scleral icterus  Right eye: No discharge  Left eye: No discharge  Cardiovascular:      Rate and Rhythm: Normal rate  Pulses:           Dorsalis pedis pulses are 2+ on the left side  Posterior tibial pulses are 2+ on the left side  Pulmonary:      Effort: Pulmonary effort is normal  No respiratory distress  Musculoskeletal:         General: No tenderness, deformity or signs of injury  Cervical back: Normal range of motion  Left foot: Decreased range of motion  No deformity, bunion, Charcot foot, foot drop or prominent metatarsal heads  Feet:      Left foot:      Skin integrity: Erythema and warmth present  No skin breakdown  Toenail Condition: Left toenails are normal       Comments: Increased pain and swelling along the right first MCP joint  Swelling extends to top of foot to the base of the ankle  No numbness or tingling  Skin:     General: Skin is warm and dry  Capillary Refill: Capillary refill takes less than 2 seconds  Coloration: Skin is not cyanotic, jaundiced or mottled  Findings: Erythema present  No ecchymosis, rash or wound     Neurological: General: No focal deficit present  Mental Status: He is alert and oriented to person, place, and time  Mental status is at baseline  Motor: No weakness  Gait: Gait abnormal (Limping right foot)  Psychiatric:         Mood and Affect: Mood normal          Behavior: Behavior normal  Behavior is cooperative  Thought Content:  Thought content normal          Vital Signs  ED Triage Vitals   Temperature Pulse Respirations Blood Pressure SpO2   03/01/23 1233 03/01/23 1233 03/01/23 1233 03/01/23 1233 03/01/23 1233   97 8 °F (36 6 °C) 73 18 129/74 98 %      Temp Source Heart Rate Source Patient Position - Orthostatic VS BP Location FiO2 (%)   03/01/23 1233 03/01/23 1233 03/01/23 1233 03/01/23 1233 --   Temporal Monitor Sitting Left arm       Pain Score       03/01/23 1525       No Pain           Vitals:    03/01/23 1233   BP: 129/74   Pulse: 73   Patient Position - Orthostatic VS: Sitting         Visual Acuity      ED Medications  Medications - No data to display    Diagnostic Studies  Results Reviewed     Procedure Component Value Units Date/Time    C-reactive protein [[id number]]  (Abnormal) Collected: 03/01/23 1423    Lab Status: Final result Specimen: Blood from Arm, Right Updated: 03/01/23 1448     CRP 9 1 mg/L     Uric acid [[id number]]  (Normal) Collected: 03/01/23 1423    Lab Status: Final result Specimen: Blood from Arm, Right Updated: 03/01/23 1448     Uric Acid 8 5 mg/dL     CBC and differential [[id number]]  (Abnormal) Collected: 03/01/23 1423    Lab Status: Final result Specimen: Blood from Arm, Right Updated: 03/01/23 1429     WBC 8 50 Thousand/uL      RBC 5 62 Million/uL      Hemoglobin 16 4 g/dL      Hematocrit 50 3 %      MCV 90 fL      MCH 29 2 pg      MCHC 32 6 g/dL      RDW 14 4 %      MPV 11 2 fL      Platelets 498 Thousands/uL      nRBC 0 /100 WBCs      Neutrophils Relative 65 %      Immat GRANS % 1 %      Lymphocytes Relative 24 %      Monocytes Relative 8 %      Eosinophils Relative 1 %      Basophils Relative 1 %      Neutrophils Absolute 5 60 Thousands/µL      Immature Grans Absolute 0 06 Thousand/uL      Lymphocytes Absolute 2 00 Thousands/µL      Monocytes Absolute 0 65 Thousand/µL      Eosinophils Absolute 0 12 Thousand/µL      Basophils Absolute 0 07 Thousands/µL     Rheumatoid factor screen [[id number]] Collected: 03/01/23 1423    Lab Status: In process Specimen: Blood from Arm, Right Updated: 03/01/23 1426                 XR foot 3+ views RIGHT   ED Interpretation by Harriet Byrne PA-C (03/01 1503)   Correction to prior note, lucency noted of right distal first metatarsal      XR ankle 3+ views RIGHT    (Results Pending)              Procedures  Procedures         ED Course  ED Course as of 03/01/23 1618   Wed Mar 01, 2023   1435 WBC: 8 50   1521 CAM walker and PT eval and treat order placed  Spoke with PT and discussed patient's presentation and concern for nondisplaced first metatarsal fracture  Working with the patient now  1527 PT at bedside working with the patient, going over use of the boot, and precautions  SBIRT 22yo+    Flowsheet Row Most Recent Value   SBIRT (25 yo +)    In order to provide better care to our patients, we are screening all of our patients for alcohol and drug use  Would it be okay to ask you these screening questions? Yes Filed at: 03/01/2023 1434   Initial Alcohol Screen: US AUDIT-C     1  How often do you have a drink containing alcohol? 0 Filed at: 03/01/2023 1434   2  How many drinks containing alcohol do you have on a typical day you are drinking? 0 Filed at: 03/01/2023 1434   3a  Male UNDER 65: How often do you have five or more drinks on one occasion? 0 Filed at: 03/01/2023 1434   3b  FEMALE Any Age, or MALE 65+: How often do you have 4 or more drinks on one occassion? 0 Filed at: 03/01/2023 1434   Audit-C Score 0 Filed at: 03/01/2023 1434   JOSE: How many times in the past year have you        Used an illegal drug or used a prescription medication for non-medical reasons? Never Filed at: 03/01/2023 5099                    Medical Decision Making  MDM      Patient with history as above presented with right first metatarso phalangeal joint pain  History obtained from patient  Patient was nontoxic, stable  Ambulatory into exam room  Exam as above  Labs reviewed  Independently reviewed imaging  Reviewed external records  Differential diagnosis considered  Overall presentation is consistent with closed nondisplaced distal first metatarsal fracture   Low suspicion for gout, cellulitis, rheumatoid arthritis, reactive arthritis, Lyme disease, or other serious pathology  Patient evaluated by PT and placed in CAM walker boot  Patient's pain was low at 3/10 here in the ED and declined pain medication  Consideration was given for admission, but the patient was stable for outpatient management  Discussed x-ray findings which show a possible lucency at the left distal first metatarsal   Recommend follow-up with orthopedics and use cam walker whenever ambulating  Cautioned patient to not use Advil for pain, use Tylenol instead  Patient given prescription for oxycodone 5 mg to use 1 tablet every 6 hours as needed for severe pain  Cautioned patient regarding narcotic use and the risk for falls, unsteady gait, constipation, and confusion  Recommended to the patient to use the oxycodone at night before bed if possible and Tylenol during the day  Also discussed rest, ice, and elevation  Referral given to orthopedics  Patient cautioned to check his skin often to ensure the boot is not causing skin breakdown, especially in light of his diabetes  Had a jackie discussion regarding checking his feet and skin often as diabetes can cause decreased sensation and that may have contributed to his injury and him not perceiving a trauma at the time it occurred      Discussed other labs obtained including rheumatoid factor and uric acid level  Uric acid was in the normal limits, rheumatoid factor will return after discharge  Patient instructed to follow-up with family doctor at [facility] to discuss results  Disposition: Discussed need to follow up diagnostics, including incidental findings  Discharged with instructions to obtain outpatient follow up of patient's symptoms and findings, with strict return precautions if patient develops new or worsening symptoms  Patient verbalized understanding and agreement with the plan of care as outlined above  Closed nondisplaced fracture of first metatarsal bone of right foot, initial encounter: acute illness or injury  Amount and/or Complexity of Data Reviewed  External Data Reviewed: labs and notes  Labs: ordered  Decision-making details documented in ED Course  Radiology: ordered and independent interpretation performed  Decision-making details documented in ED Course  Risk  Prescription drug management  Disposition  Final diagnoses:   Closed nondisplaced fracture of first metatarsal bone of right foot, initial encounter     Time reflects when diagnosis was documented in both MDM as applicable and the Disposition within this note     Time User Action Codes Description Comment    3/1/2023  3:03 PM Avery Guidry April Add [T72 315A] Closed nondisplaced fracture of first metatarsal bone of left foot, initial encounter     3/1/2023  3:04 PM Mike April Remove [S92 315A] Closed nondisplaced fracture of first metatarsal bone of left foot, initial encounter     3/1/2023  3:04 PM Avery Guidry April Add [B22 314A] Closed nondisplaced fracture of first metatarsal bone of right foot, initial encounter       ED Disposition     ED Disposition   Discharge    Condition   Stable    Date/Time   Wed Mar 1, 2023  3:01 PM    Comment   Frutoso Si Refai discharge to home/self care                 Follow-up Information     Follow up With Specialties Details Why Contact Info Additional [address] [facility] Go in 2 days  [address] [facility]  [address] [id number]  [address] Specialists [facility], [address] [address] [place], [place], [address] [facility]    Cookie Daly  Go to  For follow up visit [address] [facility] [phone] Mal Ang[place]   [phone] (Fax)     [facility] Emergency Medicine Go to  If symptoms worsen [address] [facility] [address] [facility] Emergency Department, [address] [place], [place], [address]          Discharge Medication List as of 3/1/2023  3:21 PM      START taking these medications    Details   oxyCODONE (Roxicodone) 5 immediate release tablet Take 1 tablet (5 mg total) by mouth every 6 (six) hours as needed for moderate pain for up to 10 doses Max Daily Amount: 20 mg, Starting Wed 3/1/2023, Normal                 PDMP Review     None          ED Provider  Electronically Signed by           Harriet Rogers PA-C  03/01/23 8188

## 2023-03-01 NOTE — ORTHOTIC NOTE
Orthotic Note    Date: 3/1/2023    Patient Name: Daylin Owens        Reason for Consult:  Order placed for CAM walker brace per PA April  Recommendations:  Pt sized w/ XL CAM walker prior to PT mobility assessment, see PT evaluation for assessment findings  Pt educated on brace components, wearing schedule when OOB per MD recommendation, maintenance of brace, donning/doffing, skin inspection  Educated that brace may need to be repositioned throughout the day  Pt verbalized understanding w/ all components, requiring mod assistance to don/doff brace w/ verbal instruction 100% of the time from therapist  Education to [facility] staff on same, including don/doffing, wearing schedule, skin inspection of NSG q shift as well as assessing circulation for appropriate fit of brace      Les Celestin, PT

## 2023-03-02 ENCOUNTER — TELEPHONE (OUTPATIENT)
Dept: OBGYN CLINIC | Facility: HOSPITAL | Age: 73
End: 2023-03-02

## 2023-03-02 LAB — RHEUMATOID FACT SER QL LA: NEGATIVE

## 2023-03-02 NOTE — TELEPHONE ENCOUNTER
Caller: Daughter    Doctor: Podiatry    Reason for call: Patient called asking to speak to  [place] [address] [facility]  Provided number and warm transfer to [place] [address] [facility]        Call back#: [phone]

## 2023-03-02 NOTE — ED ATTENDING ATTESTATION
3/1/2023  IDiana MD, saw and evaluated the patient  I have discussed the patient with the resident/non-physician practitioner and agree with the resident's/non-physician practitioner's findings, Plan of Care, and MDM as documented in the resident's/non-physician practitioner's note, except where noted  All available labs and Radiology studies were reviewed  I was present for key portions of any procedure(s) performed by the resident/non-physician practitioner and I was immediately available to provide assistance  At this point I agree with the current assessment done in the Emergency Department  I have conducted an independent evaluation of this patient a history and physical is as follows:Patient is a 81-year-old male also seen by me history of the present illness is pain in the right foot at the metacarpal phalangeal joint  Patient reports driving a long distance over the weekend using his foot to apply pressure to the gas pedal complaining of pain at the metacarpal phalangeal joint  Denies any history of gouty arthritis  Physical exam shows redness and induration at the metacarpal phalangeal site on the right foot pain with range of motion and tenderness  Presentation consistent with monarticular arthritis  Patient denies any direct trauma  Diagnostic work-up showed an x-ray consistent with a fracture at the metacarpal phalangeal site  Advanced practitioner discussed immobilization with the patient    We discussed follow-up    ED Course     Placed in a cam walker    Critical Care Time  Procedures

## 2023-03-21 ENCOUNTER — HOSPITAL ENCOUNTER (OUTPATIENT)
Dept: RADIOLOGY | Facility: HOSPITAL | Age: 73
Discharge: HOME | End: 2023-03-21
Attending: PODIATRIST

## 2023-03-21 ENCOUNTER — OFFICE VISIT (OUTPATIENT)
Dept: PODIATRY | Facility: CLINIC | Age: 73
End: 2023-03-21

## 2023-03-21 VITALS
DIASTOLIC BLOOD PRESSURE: 87 MMHG | HEIGHT: 68 IN | WEIGHT: 286 LBS | OXYGEN SATURATION: 97 % | BODY MASS INDEX: 43.35 KG/M2 | HEART RATE: 60 BPM | SYSTOLIC BLOOD PRESSURE: 124 MMHG

## 2023-03-21 DIAGNOSIS — S92.314A CLOSED NONDISPLACED FRACTURE OF FIRST METATARSAL BONE OF RIGHT FOOT, INITIAL ENCOUNTER: ICD-10-CM

## 2023-03-21 RX ORDER — ATORVASTATIN CALCIUM 40 MG/1
TABLET, FILM COATED ORAL
COMMUNITY
Start: 2023-03-03

## 2023-03-21 RX ORDER — FLUTICASONE PROPIONATE 50 MCG
SPRAY, SUSPENSION (ML) NASAL
COMMUNITY
Start: 2023-01-29

## 2023-03-21 RX ORDER — METOPROLOL SUCCINATE 25 MG/1
25 TABLET, EXTENDED RELEASE ORAL DAILY
COMMUNITY
Start: 2023-02-22

## 2023-03-21 RX ORDER — APIXABAN 5 MG/1
5 TABLET, FILM COATED ORAL 2 TIMES DAILY
COMMUNITY
Start: 2022-12-30

## 2023-03-21 RX ORDER — LISINOPRIL 20 MG/1
20 TABLET ORAL DAILY
COMMUNITY
Start: 2023-03-18

## 2023-03-21 NOTE — PROGRESS NOTES
Assessment/Plan:    Repeat x-rays of the patient's right foot taken today were personally reviewed and interpreted and shows progressive healing of the suspected fracture to the head of the first metatarsal   These were compared to his prior films taken on March 1, 2023  The patient's clinical examination was benign  There is no erythema nor edema to the patient's right forefoot  There is no tenderness with palpation or with range of motion of the right first metatarsophalangeal joint  The patient has already weaned himself in his cam boot and is ambulating with regular shoes without pain  He may resume activities and shoe gear as tolerated  Follow-up as needed  Diagnoses and all orders for this visit:    Closed nondisplaced fracture of first metatarsal bone of right foot, initial encounter  -     Ambulatory Referral to Orthopedic Surgery  -     X-ray foot right 3+ views; Future    Other orders  -     Eliquis 5 MG; Take 5 mg by mouth 2 (two) times a day  -     atorvastatin (LIPITOR) 40 mg tablet; TAKE 1 TABLET BY MOUTH EVERY DAY AT NIGHT  -     fluticasone (FLONASE) 50 mcg/act nasal spray; SPRAY 1 SQUIRT IN THE NOSTRILS TWICE A DAY  -     lisinopril (ZESTRIL) 20 mg tablet; Take 20 mg by mouth daily  -     metFORMIN (GLUCOPHAGE) 1000 MG tablet; Take 1,000 mg by mouth 2 (two) times a day with meals  -     metoprolol succinate (TOPROL-XL) 25 mg 24 hr tablet; Take 25 mg by mouth daily          Subjective:      Patient ID: Taylor Barber is a 71 y o  male  The patient presents today for his initial consultation with [facility] for treatment evaluation of a right first metatarsal fracture  He sustained the injury nearly 3 weeks ago during a trip to [place]  He cannot recall any specific injury or trauma    He was initially evaluated in the emergency department on March 1, 2023 at which time he was diagnosed with a suspected fracture to the distal aspect of the right first metatarsal   Placed in a cam boot and instructed to follow-up with orthopedics or podiatry  Today, he notes complete resolution of his right foot pain  He has weaned himself out of the cam boot and is currently ambulating in a pair of hiking boots without any discomfort  He sates that the cam boot was too heavy and he was unable to tolerate it  The following portions of the patient's history were reviewed and updated as appropriate: allergies, current medications, past family history, past medical history, past social history, past surgical history and problem list       PAST MEDICAL HISTORY:  Past Medical History:   Diagnosis Date   • Cancer ([facility] [address] )     lymphoma   • Cardiac disease     pacemaker   • Hyperlipidemia    • Hypertension        PAST SURGICAL HISTORY:  Past Surgical History:   Procedure Laterality Date   • CARDIAC PACEMAKER PLACEMENT          ALLERGIES:  Patient has no known allergies  MEDICATIONS:  Current Outpatient Medications   Medication Sig Dispense Refill   • atorvastatin (LIPITOR) 40 mg tablet TAKE 1 TABLET BY MOUTH EVERY DAY AT NIGHT     • Eliquis 5 MG Take 5 mg by mouth 2 (two) times a day     • fluticasone (FLONASE) 50 mcg/act nasal spray SPRAY 1 SQUIRT IN THE NOSTRILS TWICE A DAY     • lisinopril (ZESTRIL) 20 mg tablet Take 20 mg by mouth daily     • metFORMIN (GLUCOPHAGE) 1000 MG tablet Take 1,000 mg by mouth 2 (two) times a day with meals     • metoprolol succinate (TOPROL-XL) 25 mg 24 hr tablet Take 25 mg by mouth daily     • oxyCODONE (Roxicodone) 5 immediate release tablet Take 1 tablet (5 mg total) by mouth every 6 (six) hours as needed for moderate pain for up to 10 doses Max Daily Amount: 20 mg 10 tablet 0     No current facility-administered medications for this visit         SOCIAL HISTORY:  Social History     Socioeconomic History   • Marital status: /Civil Union     Spouse name: None   • Number of children: None   • Years of education: None   • Highest education level: None   Occupational History   • None   Tobacco Use   • Smoking status: Never   • Smokeless tobacco: Never   Vaping Use   • Vaping Use: Never used   Substance and Sexual Activity   • Alcohol use: Never   • Drug use: Never   • Sexual activity: None   Other Topics Concern   • None   Social History Narrative   • None     Social Determinants of Health     Financial Resource Strain: Not on file   Food Insecurity: Not on file   Transportation Needs: Not on file   Physical Activity: Not on file   Stress: Not on file   Social Connections: Not on file   Intimate Partner Violence: Not on file   Housing Stability: Not on file        Review of Systems   Constitutional: Negative  HENT: Negative  Eyes: Negative  Respiratory: Negative  Cardiovascular: Negative  Endocrine: Negative  Musculoskeletal: Negative  Neurological: Negative  Hematological: Negative  Psychiatric/Behavioral: Negative  Objective:      /87 (BP Location: Left arm, Patient Position: Sitting, Cuff Size: Standard)   Pulse 60   Ht 5' 8" (1 727 m)   Wt 130 kg (286 lb)   SpO2 97%   BMI 43 49 kg/m²          Physical Exam  Vitals reviewed  Constitutional:       Appearance: Normal appearance  HENT:      Head: Normocephalic and atraumatic  Nose: Nose normal    Eyes:      Conjunctiva/sclera: Conjunctivae normal       Pupils: Pupils are equal, round, and reactive to light  Cardiovascular:      Pulses:           Dorsalis pedis pulses are 2+ on the right side  Posterior tibial pulses are 2+ on the right side  Pulmonary:      Effort: Pulmonary effort is normal    Feet:      Right foot:      Skin integrity: Skin integrity normal       Comments:  There is no tenderness with palpation of the patient's right first metatarsal phalangeal joint today; there is no tenderness with passive or active range of motion of the first MTPJ; there is no erythema no edema no calor nor ecchymosis to the right foot; osseous spurring is noted to the dorsal aspect of the first metatarsal head and neck consistent with DJD  Skin:     General: Skin is warm  Capillary Refill: Capillary refill takes less than 2 seconds  Neurological:      General: No focal deficit present  Mental Status: He is alert and oriented to person, place, and time  Psychiatric:         Mood and Affect: Mood normal          Behavior: Behavior normal          Thought Content:  Thought content normal

## 2023-12-09 ENCOUNTER — HOSPITAL ENCOUNTER (EMERGENCY)
Facility: HOSPITAL | Age: 73
Discharge: HOME | End: 2023-12-09
Attending: STUDENT IN AN ORGANIZED HEALTH CARE EDUCATION/TRAINING PROGRAM
Payer: MEDICARE

## 2023-12-09 ENCOUNTER — APPOINTMENT (EMERGENCY)
Dept: RADIOLOGY | Facility: HOSPITAL | Age: 73
End: 2023-12-09
Payer: MEDICARE

## 2023-12-09 VITALS
RESPIRATION RATE: 16 BRPM | DIASTOLIC BLOOD PRESSURE: 65 MMHG | OXYGEN SATURATION: 98 % | SYSTOLIC BLOOD PRESSURE: 146 MMHG | HEART RATE: 60 BPM | TEMPERATURE: 98 F

## 2023-12-09 DIAGNOSIS — M79.606 LEG PAIN: Primary | ICD-10-CM

## 2023-12-09 DIAGNOSIS — R53.1 WEAKNESS: ICD-10-CM

## 2023-12-09 LAB
2HR DELTA HS TROPONIN: -1 NG/L
ANION GAP SERPL CALCULATED.3IONS-SCNC: 7 MMOL/L
ATRIAL RATE: 147 BPM
ATRIAL RATE: 326 BPM
ATRIAL RATE: 357 BPM
BASOPHILS # BLD AUTO: 0.04 THOUSANDS/ÂΜL (ref 0–0.1)
BASOPHILS NFR BLD AUTO: 1 % (ref 0–1)
BNP SERPL-MCNC: 65 PG/ML (ref 0–100)
BUN SERPL-MCNC: 20 MG/DL (ref 5–25)
CALCIUM SERPL-MCNC: 9.9 MG/DL (ref 8.4–10.2)
CARDIAC TROPONIN I PNL SERPL HS: 10 NG/L
CARDIAC TROPONIN I PNL SERPL HS: 11 NG/L
CHLORIDE SERPL-SCNC: 104 MMOL/L (ref 96–108)
CO2 SERPL-SCNC: 27 MMOL/L (ref 21–32)
CREAT SERPL-MCNC: 0.89 MG/DL (ref 0.6–1.3)
EOSINOPHIL # BLD AUTO: 0.08 THOUSAND/ÂΜL (ref 0–0.61)
EOSINOPHIL NFR BLD AUTO: 1 % (ref 0–6)
ERYTHROCYTE [DISTWIDTH] IN BLOOD BY AUTOMATED COUNT: 14.1 % (ref 11.6–15.1)
GFR SERPL CREATININE-BSD FRML MDRD: 85 ML/MIN/1.73SQ M
GLUCOSE SERPL-MCNC: 154 MG/DL (ref 65–140)
HCT VFR BLD AUTO: 50.8 % (ref 36.5–49.3)
HGB BLD-MCNC: 16.7 G/DL (ref 12–17)
IMM GRANULOCYTES # BLD AUTO: 0.06 THOUSAND/UL (ref 0–0.2)
IMM GRANULOCYTES NFR BLD AUTO: 1 % (ref 0–2)
LYMPHOCYTES # BLD AUTO: 1.61 THOUSANDS/ÂΜL (ref 0.6–4.47)
LYMPHOCYTES NFR BLD AUTO: 21 % (ref 14–44)
MCH RBC QN AUTO: 29.1 PG (ref 26.8–34.3)
MCHC RBC AUTO-ENTMCNC: 32.9 G/DL (ref 31.4–37.4)
MCV RBC AUTO: 89 FL (ref 82–98)
MONOCYTES # BLD AUTO: 0.49 THOUSAND/ÂΜL (ref 0.17–1.22)
MONOCYTES NFR BLD AUTO: 6 % (ref 4–12)
NEUTROPHILS # BLD AUTO: 5.54 THOUSANDS/ÂΜL (ref 1.85–7.62)
NEUTS SEG NFR BLD AUTO: 70 % (ref 43–75)
NRBC BLD AUTO-RTO: 0 /100 WBCS
PLATELET # BLD AUTO: 161 THOUSANDS/UL (ref 149–390)
PMV BLD AUTO: 11.1 FL (ref 8.9–12.7)
POTASSIUM SERPL-SCNC: 4.4 MMOL/L (ref 3.5–5.3)
QRS AXIS: -34 DEGREES
QRS AXIS: 213 DEGREES
QRS AXIS: 214 DEGREES
QRSD INTERVAL: 112 MS
QRSD INTERVAL: 142 MS
QRSD INTERVAL: 146 MS
QT INTERVAL: 400 MS
QT INTERVAL: 436 MS
QT INTERVAL: 438 MS
QTC INTERVAL: 400 MS
QTC INTERVAL: 436 MS
QTC INTERVAL: 438 MS
RBC # BLD AUTO: 5.74 MILLION/UL (ref 3.88–5.62)
SODIUM SERPL-SCNC: 138 MMOL/L (ref 135–147)
T WAVE AXIS: 24 DEGREES
T WAVE AXIS: 27 DEGREES
T WAVE AXIS: 37 DEGREES
VENTRICULAR RATE: 60 BPM
WBC # BLD AUTO: 7.82 THOUSAND/UL (ref 4.31–10.16)

## 2023-12-09 PROCEDURE — 73552 X-RAY EXAM OF FEMUR 2/>: CPT

## 2023-12-09 PROCEDURE — 99285 EMERGENCY DEPT VISIT HI MDM: CPT | Performed by: STUDENT IN AN ORGANIZED HEALTH CARE EDUCATION/TRAINING PROGRAM

## 2023-12-09 PROCEDURE — 93005 ELECTROCARDIOGRAM TRACING: CPT

## 2023-12-09 PROCEDURE — 71045 X-RAY EXAM CHEST 1 VIEW: CPT

## 2023-12-09 PROCEDURE — 36415 COLL VENOUS BLD VENIPUNCTURE: CPT | Performed by: STUDENT IN AN ORGANIZED HEALTH CARE EDUCATION/TRAINING PROGRAM

## 2023-12-09 PROCEDURE — 99284 EMERGENCY DEPT VISIT MOD MDM: CPT

## 2023-12-09 PROCEDURE — 85025 COMPLETE CBC W/AUTO DIFF WBC: CPT | Performed by: STUDENT IN AN ORGANIZED HEALTH CARE EDUCATION/TRAINING PROGRAM

## 2023-12-09 PROCEDURE — 84484 ASSAY OF TROPONIN QUANT: CPT | Performed by: STUDENT IN AN ORGANIZED HEALTH CARE EDUCATION/TRAINING PROGRAM

## 2023-12-09 PROCEDURE — 83880 ASSAY OF NATRIURETIC PEPTIDE: CPT | Performed by: STUDENT IN AN ORGANIZED HEALTH CARE EDUCATION/TRAINING PROGRAM

## 2023-12-09 PROCEDURE — 80048 BASIC METABOLIC PNL TOTAL CA: CPT | Performed by: STUDENT IN AN ORGANIZED HEALTH CARE EDUCATION/TRAINING PROGRAM

## 2023-12-09 RX ORDER — HYDROCODONE BITARTRATE AND ACETAMINOPHEN 5; 325 MG/1; MG/1
1 TABLET ORAL ONCE
Status: COMPLETED | OUTPATIENT
Start: 2023-12-09 | End: 2023-12-09

## 2023-12-09 RX ADMIN — HYDROCODONE BITARTRATE AND ACETAMINOPHEN 1 TABLET: 5; 325 TABLET ORAL at 08:54

## 2023-12-09 NOTE — ED NOTES
Interrogation of pt's implanted [facility] pacer being completed, again, at this time     Khoa Brandon RN  12/09/23 3711

## 2023-12-09 NOTE — ED NOTES
Pt ambulated to the bathroom with the assistance of his personal cane; ambulated well; denies dizziness     Laila Hughes RN  12/09/23 5887

## 2023-12-09 NOTE — ED NOTES
Call to Dallin To, regarding report not being received; Joey Herzog states transmission was never received     Kerrie Vee RN  12/09/23 3165

## 2023-12-09 NOTE — DISCHARGE INSTRUCTIONS
Continue taking all your medications as prescribed. You can use Tylenol, Lidoderm patches and Voltaren for discomfort. Follow-up with primary care physician for reevaluation. Informed to follow-up with your cardiologist to reassess today's evaluation. Return if you have any new or worse symptoms such as chest pain, shortness of breath or difficulty breathing.

## 2023-12-09 NOTE — ED PROVIDER NOTES
History  Chief Complaint   Patient presents with    Fall     Patient c/o 2 falls in the last week landing on left hip. Patient denies ASA/thinners or head injury. States "my bed is too high and I keep falling when I try to get up . "      HPI      Patient is a 67-year-old male presenting for department after hurt his leg getting into bed. Patient hit his left thigh. Patient did not fall down or hit his head. Denies any loss of consciousness. Denies any vision changes. Patient has localized discomfort to his leg with intermittent numbness in his toes. Review of systems otherwise negative. History includes CAD, hyperlipidemia and hypertension. Prior to Admission Medications   Prescriptions Last Dose Informant Patient Reported? Taking? Eliquis 5 MG  Self Yes No   Sig: Take 5 mg by mouth 2 (two) times a day   atorvastatin (LIPITOR) 40 mg tablet  Self Yes No   Sig: TAKE 1 TABLET BY MOUTH EVERY DAY AT NIGHT   fluticasone (FLONASE) 50 mcg/act nasal spray  Self Yes No   Sig: SPRAY 1 SQUIRT IN THE NOSTRILS TWICE A DAY   lisinopril (ZESTRIL) 20 mg tablet  Self Yes No   Sig: Take 20 mg by mouth daily   metFORMIN (GLUCOPHAGE) 1000 MG tablet  Self Yes No   Sig: Take 1,000 mg by mouth 2 (two) times a day with meals   metoprolol succinate (TOPROL-XL) 25 mg 24 hr tablet  Self Yes No   Sig: Take 25 mg by mouth daily   oxyCODONE (Roxicodone) 5 immediate release tablet  Self No No   Sig: Take 1 tablet (5 mg total) by mouth every 6 (six) hours as needed for moderate pain for up to 10 doses Max Daily Amount: 20 mg      Facility-Administered Medications: None       Past Medical History:   Diagnosis Date    Cancer ([address])     lymphoma    Cardiac disease     pacemaker    Hyperlipidemia     Hypertension        Past Surgical History:   Procedure Laterality Date    CARDIAC PACEMAKER PLACEMENT         No family history on file. I have reviewed and agree with the history as documented.     E-Cigarette/Vaping    E-Cigarette Use Never User      E-Cigarette/Vaping Substances     Social History     Tobacco Use    Smoking status: Never    Smokeless tobacco: Never   Vaping Use    Vaping Use: Never used   Substance Use Topics    Alcohol use: Never    Drug use: Never       Review of Systems   Constitutional:  Negative for chills and fever. HENT:  Negative for ear pain and sore throat. Eyes:  Negative for pain and visual disturbance. Respiratory:  Negative for cough and shortness of breath. Cardiovascular:  Negative for chest pain and palpitations. Gastrointestinal:  Negative for abdominal pain and vomiting. Genitourinary:  Negative for dysuria and hematuria. Musculoskeletal:  Negative for arthralgias and back pain. Thigh pain   Skin:  Negative for color change and rash. Neurological:  Negative for seizures and syncope. All other systems reviewed and are negative. Physical Exam  Physical Exam  Vitals and nursing note reviewed. Constitutional:       General: He is not in acute distress. Appearance: He is well-developed. HENT:      Head: Normocephalic and atraumatic. Eyes:      Conjunctiva/sclera: Conjunctivae normal.   Cardiovascular:      Rate and Rhythm: Normal rate and regular rhythm. Heart sounds: No murmur heard. Pulmonary:      Effort: Pulmonary effort is normal. No respiratory distress. Breath sounds: Normal breath sounds. Abdominal:      Palpations: Abdomen is soft. Tenderness: There is no abdominal tenderness. Musculoskeletal:         General: No swelling. Cervical back: Neck supple. Skin:     General: Skin is warm and dry. Capillary Refill: Capillary refill takes less than 2 seconds. Neurological:      General: No focal deficit present. Mental Status: He is alert and oriented to person, place, and time. Motor: No weakness.       Coordination: Coordination normal.      Gait: Gait normal.      Deep Tendon Reflexes: Reflexes normal.      Comments: Sensation intact LLE, strength, rom intact in keiry lower extremities   Psychiatric:         Mood and Affect: Mood normal.         Vital Signs  ED Triage Vitals [12/09/23 0811]   Temperature Pulse Respirations Blood Pressure SpO2   98 °F (36.7 °C) 61 16 151/75 99 %      Temp src Heart Rate Source Patient Position - Orthostatic VS BP Location FiO2 (%)   -- -- -- -- --      Pain Score       --           Vitals:    12/09/23 0811 12/09/23 0843   BP: 151/75 151/75   Pulse: 61          Visual Acuity  Visual Acuity      Flowsheet Row Most Recent Value   L Pupil Size (mm) 3   R Pupil Size (mm) 3            ED Medications  Medications   HYDROcodone-acetaminophen (NORCO) 5-325 mg per tablet 1 tablet (has no administration in time range)       Diagnostic Studies  Results Reviewed       None                   XR femur 2 views LEFT    (Results Pending)              Procedures  Procedures         ED Course                               SBIRT 20yo+      Flowsheet Row Most Recent Value   Initial Alcohol Screen: US AUDIT-C     1. How often do you have a drink containing alcohol? 0 Filed at: 12/09/2023 0843   2. How many drinks containing alcohol do you have on a typical day you are drinking? 0 Filed at: 12/09/2023 0843   3a. Male UNDER 65: How often do you have five or more drinks on one occasion? 0 Filed at: 12/09/2023 0843   3b. FEMALE Any Age, or MALE 65+: How often do you have 4 or more drinks on one occassion? 0 Filed at: 12/09/2023 0843   Audit-C Score 0 Filed at: 12/09/2023 9462   JOSE: How many times in the past year have you. .. Used an illegal drug or used a prescription medication for non-medical reasons? Never Filed at: 12/09/2023 8995                      Medical Decision Making  Differential: hematoma, femur fx, msk strain    Patient is a well-appearing 76-year-old male presenting no acute respiratory stress and vital signs overall unremarkable. Patient has point tenderness to the mid thigh.   X-ray was performed and is nonacute. Patient initially did not have any concerns about weakness and feeling unwell. Son came in at time of discharge and said that he has been feeling weak and intermittently getting short of breath when using the stairs. He has a known pacemaker. At this time will do a cardiac evaluation. Differential electrolyte abnormality, arrhythmia, ACS    EKG rate 60, ventricular paced without signs of acute ischemic change. No prior noted. EKG rate 60, ventricular-paced, no signs of acute ischemic change. Amount and/or Complexity of Data Reviewed  Labs: ordered. Radiology: ordered. Risk  Prescription drug management. Disposition  Final diagnoses:   None     ED Disposition       None          Follow-up Information    None         Patient's Medications   Discharge Prescriptions    No medications on file       No discharge procedures on file.     PDMP Review       None            ED Provider  Electronically Signed by Alcohol Screen: US AUDIT-C     1. How often do you have a drink containing alcohol? 0 Filed at: 12/09/2023 0843   2. How many drinks containing alcohol do you have on a typical day you are drinking?  0 Filed at: 12/09/2023 0843   3a. Male UNDER 65: How often do you have five or more drinks on one occasion? 0 Filed at: 12/09/2023 0843   3b. FEMALE Any Age, or MALE 65+: How often do you have 4 or more drinks on one occassion? 0 Filed at: 12/09/2023 0843   Audit-C Score 0 Filed at: 12/09/2023 0843   JOSE: How many times in the past year have you...    Used an illegal drug or used a prescription medication for non-medical reasons? Never Filed at: 12/09/2023 0843                      Medical Decision Making  Differential: hematoma, femur fx, msk strain    Patient is a well-appearing 73-year-old male presenting no acute respiratory stress and vital signs overall unremarkable.  Patient has point tenderness to the mid thigh.  X-ray was performed and is nonacute.    Patient initially did not have any concerns about weakness and feeling unwell.  Son came in at time of discharge and said that he has been feeling weak and intermittently getting short of breath when using the stairs.  He has a known pacemaker.  At this time will do a cardiac evaluation.    Differential electrolyte abnormality, arrhythmia, ACS    EKG rate 60, ventricular paced without signs of acute ischemic change. No prior noted.  EKG rate 60, ventricular-paced, no signs of acute ischemic change.     Neg troponin x2. Non acute chest xray.     Discussed symptomatic management as well as return and follow up precautions.   Dispo discharge.    Amount and/or Complexity of Data Reviewed  Labs: ordered.  Radiology: ordered and independent interpretation performed.  ECG/medicine tests: ordered and independent interpretation performed.    Risk  Prescription drug management.             Disposition  Final diagnoses:   Leg pain   Weakness     Time reflects when diagnosis  was documented in both MDM as applicable and the Disposition within this note       Time User Action Codes Description Comment    12/9/2023 10:17 AM Nikky Larsen Add [M79.606] Leg pain     12/9/2023  1:43 PM Nikky Larsen Add [R53.1] Weakness           ED Disposition       ED Disposition   Discharge    Condition   Stable    Date/Time   Sat Dec 9, 2023 1017    Comment   Kaia Burns discharge to home/self care.                   Follow-up Information       Follow up With Specialties Details Why Contact Info Additional Information    [facility] Cardiology  If symptoms worsen [address] [address] [phone] [place] [address] [address] [address], [place], [place], [address] [phone]            Discharge Medication List as of 12/9/2023  1:44 PM        CONTINUE these medications which have NOT CHANGED    Details   atorvastatin (LIPITOR) 40 mg tablet TAKE 1 TABLET BY MOUTH EVERY DAY AT NIGHT, Historical Med      Eliquis 5 MG Take 5 mg by mouth 2 (two) times a day, Starting Fri 12/30/2022, Historical Med      fluticasone (FLONASE) 50 mcg/act nasal spray SPRAY 1 SQUIRT IN THE NOSTRILS TWICE A DAY, Historical Med      lisinopril (ZESTRIL) 20 mg tablet Take 20 mg by mouth daily, Starting Sat 3/18/2023, Historical Med      metFORMIN (GLUCOPHAGE) 1000 MG tablet Take 1,000 mg by mouth 2 (two) times a day with meals, Starting Tue 2/14/2023, Historical Med      metoprolol succinate (TOPROL-XL) 25 mg 24 hr tablet Take 25 mg by mouth daily, Starting Wed 2/22/2023, Historical Med      oxyCODONE (Roxicodone) 5 immediate release tablet Take 1 tablet (5 mg total) by mouth every 6 (six) hours as needed for moderate pain for up to 10 doses Max Daily Amount: 20 mg, Starting Wed 3/1/2023, Normal             No discharge procedures on file.    PDMP Review       None            ED  Provider  Electronically Signed by             Nikky Larsen DO  01/05/24 1743

## 2023-12-09 NOTE — ED NOTES
[facility] pacer interrogated at this time; waiting on report     Eric Valente RN  12/09/23 108 Perry Medrano RN  12/09/23 1073

## 2023-12-11 LAB
ATRIAL RATE: 357 BPM
QRS AXIS: 214 DEGREES
QRSD INTERVAL: 142 MS
QT INTERVAL: 436 MS
QTC INTERVAL: 436 MS
T WAVE AXIS: 27 DEGREES
VENTRICULAR RATE: 60 BPM

## 2024-11-23 ENCOUNTER — HOSPITAL ENCOUNTER (INPATIENT)
Facility: HOSPITAL | Age: 74
LOS: 1 days | Discharge: HOME | DRG: 084 | End: 2024-11-24
Attending: SURGERY | Admitting: SURGERY
Payer: MEDICARE

## 2024-11-23 ENCOUNTER — APPOINTMENT (EMERGENCY)
Dept: RADIOLOGY | Facility: HOSPITAL | Age: 74
End: 2024-11-23
Payer: MEDICARE

## 2024-11-23 ENCOUNTER — APPOINTMENT (EMERGENCY)
Dept: CT IMAGING | Facility: HOSPITAL | Age: 74
End: 2024-11-23
Payer: MEDICARE

## 2024-11-23 ENCOUNTER — HOSPITAL ENCOUNTER (EMERGENCY)
Facility: HOSPITAL | Age: 74
End: 2024-11-23
Attending: EMERGENCY MEDICINE | Admitting: EMERGENCY MEDICINE
Payer: MEDICARE

## 2024-11-23 ENCOUNTER — APPOINTMENT (INPATIENT)
Dept: RADIOLOGY | Facility: HOSPITAL | Age: 74
DRG: 084 | End: 2024-11-23
Payer: MEDICARE

## 2024-11-23 VITALS
OXYGEN SATURATION: 98 % | BODY MASS INDEX: 43.49 KG/M2 | HEART RATE: 60 BPM | SYSTOLIC BLOOD PRESSURE: 144 MMHG | TEMPERATURE: 98 F | HEIGHT: 68 IN | DIASTOLIC BLOOD PRESSURE: 81 MMHG | RESPIRATION RATE: 16 BRPM

## 2024-11-23 DIAGNOSIS — S06.5XAA SUBDURAL HEMATOMA (HCC): ICD-10-CM

## 2024-11-23 DIAGNOSIS — S06.5XAA SUBDURAL HEMATOMA (HCC): Primary | ICD-10-CM

## 2024-11-23 DIAGNOSIS — S00.03XA HEMATOMA OF SCALP, INITIAL ENCOUNTER: Primary | ICD-10-CM

## 2024-11-23 PROBLEM — E11.9 TYPE 2 DIABETES MELLITUS (HCC): Status: ACTIVE | Noted: 2024-11-23
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
## 2024-11-23 LAB
ANION GAP SERPL CALCULATED.3IONS-SCNC: 5 MMOL/L (ref 4–13)
APTT PPP: 29 SECONDS (ref 23–34)
BASOPHILS # BLD AUTO: 0.05 THOUSANDS/ÂΜL (ref 0–0.1)
BASOPHILS NFR BLD AUTO: 1 % (ref 0–1)
BUN SERPL-MCNC: 12 MG/DL (ref 5–25)
CALCIUM SERPL-MCNC: 9.1 MG/DL (ref 8.4–10.2)
CHLORIDE SERPL-SCNC: 103 MMOL/L (ref 96–108)
CO2 SERPL-SCNC: 30 MMOL/L (ref 21–32)
CREAT SERPL-MCNC: 0.83 MG/DL (ref 0.6–1.3)
EOSINOPHIL # BLD AUTO: 0.09 THOUSAND/ÂΜL (ref 0–0.61)
EOSINOPHIL NFR BLD AUTO: 1 % (ref 0–6)
ERYTHROCYTE [DISTWIDTH] IN BLOOD BY AUTOMATED COUNT: 14.5 % (ref 11.6–15.1)
GFR SERPL CREATININE-BSD FRML MDRD: 87 ML/MIN/1.73SQ M
GLUCOSE SERPL-MCNC: 183 MG/DL (ref 65–140)
HCT VFR BLD AUTO: 47.8 % (ref 36.5–49.3)
HGB BLD-MCNC: 15.2 G/DL (ref 12–17)
IMM GRANULOCYTES # BLD AUTO: 0.05 THOUSAND/UL (ref 0–0.2)
IMM GRANULOCYTES NFR BLD AUTO: 1 % (ref 0–2)
INR PPP: 1.01 (ref 0.85–1.19)
LYMPHOCYTES # BLD AUTO: 1.71 THOUSANDS/ÂΜL (ref 0.6–4.47)
LYMPHOCYTES NFR BLD AUTO: 19 % (ref 14–44)
MCH RBC QN AUTO: 28.5 PG (ref 26.8–34.3)
MCHC RBC AUTO-ENTMCNC: 31.8 G/DL (ref 31.4–37.4)
MCV RBC AUTO: 90 FL (ref 82–98)
MONOCYTES # BLD AUTO: 0.51 THOUSAND/ÂΜL (ref 0.17–1.22)
MONOCYTES NFR BLD AUTO: 6 % (ref 4–12)
NEUTROPHILS # BLD AUTO: 6.52 THOUSANDS/ÂΜL (ref 1.85–7.62)
NEUTS SEG NFR BLD AUTO: 72 % (ref 43–75)
NRBC BLD AUTO-RTO: 0 /100 WBCS
PLATELET # BLD AUTO: 148 THOUSANDS/UL (ref 149–390)
PMV BLD AUTO: 11.3 FL (ref 8.9–12.7)
POTASSIUM SERPL-SCNC: 4.4 MMOL/L (ref 3.5–5.3)
PROTHROMBIN TIME: 14 SECONDS (ref 12.3–15)
RBC # BLD AUTO: 5.33 MILLION/UL (ref 3.88–5.62)
SODIUM SERPL-SCNC: 138 MMOL/L (ref 135–147)
WBC # BLD AUTO: 8.93 THOUSAND/UL (ref 4.31–10.16)

## 2024-11-23 PROCEDURE — 85610 PROTHROMBIN TIME: CPT | Performed by: NURSE PRACTITIONER

## 2024-11-23 PROCEDURE — 99285 EMERGENCY DEPT VISIT HI MDM: CPT | Performed by: NURSE PRACTITIONER

## 2024-11-23 PROCEDURE — 36415 COLL VENOUS BLD VENIPUNCTURE: CPT | Performed by: NURSE PRACTITIONER

## 2024-11-23 PROCEDURE — 70450 CT HEAD/BRAIN W/O DYE: CPT

## 2024-11-23 PROCEDURE — 73610 X-RAY EXAM OF ANKLE: CPT

## 2024-11-23 PROCEDURE — 80048 BASIC METABOLIC PNL TOTAL CA: CPT | Performed by: NURSE PRACTITIONER

## 2024-11-23 PROCEDURE — 96374 THER/PROPH/DIAG INJ IV PUSH: CPT

## 2024-11-23 PROCEDURE — 71045 X-RAY EXAM CHEST 1 VIEW: CPT

## 2024-11-23 PROCEDURE — 99223 1ST HOSP IP/OBS HIGH 75: CPT | Performed by: SURGERY

## 2024-11-23 PROCEDURE — 85025 COMPLETE CBC W/AUTO DIFF WBC: CPT | Performed by: NURSE PRACTITIONER

## 2024-11-23 PROCEDURE — 99284 EMERGENCY DEPT VISIT MOD MDM: CPT

## 2024-11-23 PROCEDURE — 99285 EMERGENCY DEPT VISIT HI MDM: CPT

## 2024-11-23 PROCEDURE — 85730 THROMBOPLASTIN TIME PARTIAL: CPT | Performed by: NURSE PRACTITIONER

## 2024-11-23 RX ORDER — LEVETIRACETAM 500 MG/1
500 TABLET ORAL EVERY 12 HOURS SCHEDULED
Status: DISCONTINUED | OUTPATIENT
Start: 2024-11-23 | End: 2024-11-24 | Stop reason: HOSPADM

## 2024-11-23 RX ORDER — FLUTICASONE PROPIONATE 50 MCG
1 SPRAY, SUSPENSION (ML) NASAL EVERY 12 HOURS PRN
Status: DISCONTINUED | OUTPATIENT
Start: 2024-11-23 | End: 2024-11-24 | Stop reason: HOSPADM

## 2024-11-23 RX ORDER — INSULIN LISPRO 100 [IU]/ML
2-12 INJECTION, SOLUTION INTRAVENOUS; SUBCUTANEOUS
Status: DISCONTINUED | OUTPATIENT
Start: 2024-11-24 | End: 2024-11-24 | Stop reason: HOSPADM

## 2024-11-23 RX ORDER — OXYCODONE HYDROCHLORIDE 5 MG/1
5 TABLET ORAL EVERY 6 HOURS PRN
Refills: 0 | Status: DISCONTINUED | OUTPATIENT
Start: 2024-11-23 | End: 2024-11-24 | Stop reason: HOSPADM

## 2024-11-23 RX ORDER — ACETAMINOPHEN 325 MG/1
975 TABLET ORAL EVERY 8 HOURS SCHEDULED
Status: DISCONTINUED | OUTPATIENT
Start: 2024-11-23 | End: 2024-11-24 | Stop reason: HOSPADM

## 2024-11-23 RX ORDER — OXYCODONE HYDROCHLORIDE 10 MG/1
10 TABLET ORAL EVERY 4 HOURS PRN
Refills: 0 | Status: DISCONTINUED | OUTPATIENT
Start: 2024-11-23 | End: 2024-11-24 | Stop reason: HOSPADM

## 2024-11-23 RX ORDER — ATORVASTATIN CALCIUM 40 MG/1
40 TABLET, FILM COATED ORAL
Status: DISCONTINUED | OUTPATIENT
Start: 2024-11-23 | End: 2024-11-24 | Stop reason: HOSPADM

## 2024-11-23 RX ORDER — LISINOPRIL 20 MG/1
20 TABLET ORAL DAILY
Status: DISCONTINUED | OUTPATIENT
Start: 2024-11-24 | End: 2024-11-24 | Stop reason: HOSPADM

## 2024-11-23 RX ORDER — METOPROLOL SUCCINATE 25 MG/1
25 TABLET, EXTENDED RELEASE ORAL DAILY
Status: DISCONTINUED | OUTPATIENT
Start: 2024-11-24 | End: 2024-11-24 | Stop reason: HOSPADM

## 2024-11-23 RX ADMIN — LEVETIRACETAM 500 MG: 500 TABLET, FILM COATED ORAL at 21:28

## 2024-11-23 RX ADMIN — Medication 2000 UNITS: at 14:24

## 2024-11-23 RX ADMIN — ATORVASTATIN CALCIUM 40 MG: 40 TABLET, FILM COATED ORAL at 18:15

## 2024-11-23 RX ADMIN — ACETAMINOPHEN 975 MG: 325 TABLET, FILM COATED ORAL at 18:15

## 2024-11-23 NOTE — ASSESSMENT & PLAN NOTE
Fall down ten steps at home. CT head demonstrates small acute left subdural hematoma at medial tentorium cerebelli.    Plan:  - Admit to stepdown 2   - HOT protocol  - appreciate neurosurgery recommendations  - Keppra 500mg bid 7 day course  - Pain and nausea control  - repeat CT head 11/24 AM

## 2024-11-23 NOTE — EMTALA/ACUTE CARE TRANSFER
[facility] EMERGENCY DEPARTMENT  [address]  [facility] [address]  Dept: [phone]      EMTALA TRANSFER CONSENT    NAME Kaia Burns                                         1950                              MRN [id number]    I have been informed of my rights regarding examination, treatment, and transfer   by Dr. Abdulaziz Humphrey MD    Benefits: Specialized equipment and/or services available at the receiving facility (Include comment)________________________    Risks: Potential for delay in receiving treatment, Potential deterioration of medical condition, Loss of IV, Increased discomfort during transfer      Consent for Transfer:  I acknowledge that my medical condition has been evaluated and explained to me by the emergency department physician or other qualified medical person and/or my attending physician, who has recommended that I be transferred to the service of  Accepting Physician: Chalo at Accepting Facility Name, City & State : [facility]. The above potential benefits of such transfer, the potential risks associated with such transfer, and the probable risks of not being transferred have been explained to me, and I fully understand them.  The doctor has explained that, in my case, the benefits of transfer outweigh the risks.  I agree to be transferred.    I authorize the performance of emergency medical procedures and treatments upon me in both transit and upon arrival at the receiving facility.  Additionally, I authorize the release of any and all medical records to the receiving facility and request they be transported with me, if possible.  I understand that the safest mode of transportation during a medical emergency is an ambulance and that the Hospital advocates the use of this mode of transport. Risks of traveling to the receiving facility by car, including absence of medical control, life sustaining equipment, such as oxygen, and medical personnel has been  explained to me and I fully understand them.    (RENEA CORRECT BOX BELOW)  [  ]  I consent to the stated transfer and to be transported by ambulance/helicopter.  [  ]  I consent to the stated transfer, but refuse transportation by ambulance and accept full responsibility for my transportation by car.  I understand the risks of non-ambulance transfers and I exonerate the Hospital and its staff from any deterioration in my condition that results from this refusal.    X___________________________________________    DATE  24  TIME________  Signature of patient or legally responsible individual signing on patient behalf           RELATIONSHIP TO PATIENT_________________________          Provider Certification    NAME Kaia Toddai                                         1950                              MRN [id number]    A medical screening exam was performed on the above named patient.  Based on the examination:    Condition Necessitating Transfer The primary encounter diagnosis was Hematoma of scalp, initial encounter. A diagnosis of Subdural hematoma (HCC) was also pertinent to this visit.    Patient Condition: The patient has been stabilized such that within reasonable medical probability, no material deterioration of the patient condition or the condition of the unborn child(katie) is likely to result from the transfer    Reason for Transfer: Level of Care needed not available at this facility (Trauma)    Transfer Requirements: Facility [facility]   Space available and qualified personnel available for treatment as acknowledged by PAC  Agreed to accept transfer and to provide appropriate medical treatment as acknowledged by       Chalo  Appropriate medical records of the examination and treatment of the patient are provided at the time of transfer   STAFF INITIAL WHEN COMPLETED _______  Transfer will be performed by qualified personnel from [facility]  and appropriate transfer equipment as required, including  the use of necessary and appropriate life support measures.    Provider Certification: I have examined the patient and explained the following risks and benefits of being transferred/refusing transfer to the patient/family:  General risk, such as traffic hazards, adverse weather conditions, rough terrain or turbulence, possible failure of equipment (including vehicle or aircraft), or consequences of actions of persons outside the control of the transport personnel      Based on these reasonable risks and benefits to the patient and/or the unborn child(katie), and based upon the information available at the time of the patient’s examination, I certify that the medical benefits reasonably to be expected from the provision of appropriate medical treatments at another medical facility outweigh the increasing risks, if any, to the individual’s medical condition, and in the case of labor to the unborn child, from effecting the transfer.    X____________________________________________ DATE 11/23/24        TIME_______      ORIGINAL - SEND TO MEDICAL RECORDS   COPY - SEND WITH PATIENT DURING TRANSFER

## 2024-11-23 NOTE — ED PROVIDER NOTES
"Emergency Department Trauma Note  Kaia Burns 74 y.o. male MRN: [id number]  Unit/Bed#: ED 18/ED 18 Encounter: [id number]      Trauma Alert: Trauma Acuity: Trauma Evaluation  Model of Arrival: Mode of Arrival:  (via private vehicle) via    Trauma Team: Current Providers  Attending Provider: Abdulaziz Humphrey MD  Registered Nurse: Isabelle Colindres RN  Nurse Practitioner: RUSH Yoo  Consultants:     None      History of Present Illness     Chief Complaint:   Chief Complaint   Patient presents with    Fall     Pt reports \"falling down 10 steps at home this morning. +HS, +eliquis, -LOC. Also c/o L ankle pain\"     HPI:  Kaia Burns is a 74 y.o. male who presents with head injury after fall.  Mechanism:Details of Incident: pt reports falling down 10 steps at home, hitting head, +eliquis, -loc, denies headache/dizziness. large hematome to posterior head Injury Date: 11/23/24 Injury Time: 1100      74-year-old male patient who lost his footing going down the steps falling backwards striking the back of his head he has a large hematoma over the crown but closer to the occiput.  He denies any loss of consciousness.  Denies any other injury as a result of this.      Fall  Associated symptoms: no abdominal pain, no back pain, no chest pain, no seizures and no vomiting      Review of Systems   Constitutional:  Negative for chills and fever.   HENT:  Negative for ear pain and sore throat.    Eyes:  Negative for pain and visual disturbance.   Respiratory:  Negative for cough and shortness of breath.    Cardiovascular:  Negative for chest pain and palpitations.   Gastrointestinal:  Negative for abdominal pain and vomiting.   Genitourinary:  Negative for dysuria and hematuria.   Musculoskeletal:  Negative for arthralgias and back pain.   Skin:  Negative for color change and rash.   Neurological:  Negative for seizures and syncope.   All other systems reviewed and are negative.      Historical Information "     Immunizations:   Immunization History   Administered Date(s) Administered    COVID-19 MODERNA VACC 0.5 ML IM 04/20/2021, 05/18/2021    COVID-19 Pfizer mRNA vacc PF masood-sucrose 12 yr and older (Comirnaty) 01/08/2024       Past Medical History:   Diagnosis Date    Cancer (HCC)     lymphoma    Cardiac disease     pacemaker    Hyperlipidemia     Hypertension      History reviewed. No pertinent family history.  Past Surgical History:   Procedure Laterality Date    CARDIAC PACEMAKER PLACEMENT       Social History     Tobacco Use    Smoking status: Never    Smokeless tobacco: Never   Vaping Use    Vaping status: Never Used   Substance Use Topics    Alcohol use: Never    Drug use: Never     E-Cigarette/Vaping    E-Cigarette Use Never User      E-Cigarette/Vaping Substances       Family History: non-contributory    Meds/Allergies   Prior to Admission Medications   Prescriptions Last Dose Informant Patient Reported? Taking?   Eliquis 5 MG  Self Yes No   Sig: Take 5 mg by mouth 2 (two) times a day   atorvastatin (LIPITOR) 40 mg tablet  Self Yes No   Sig: TAKE 1 TABLET BY MOUTH EVERY DAY AT NIGHT   fluticasone (FLONASE) 50 mcg/act nasal spray  Self Yes No   Sig: SPRAY 1 SQUIRT IN THE NOSTRILS TWICE A DAY   lisinopril (ZESTRIL) 20 mg tablet  Self Yes No   Sig: Take 20 mg by mouth daily   metFORMIN (GLUCOPHAGE) 1000 MG tablet  Self Yes No   Sig: Take 1,000 mg by mouth 2 (two) times a day with meals   metoprolol succinate (TOPROL-XL) 25 mg 24 hr tablet  Self Yes No   Sig: Take 25 mg by mouth daily   oxyCODONE (Roxicodone) 5 immediate release tablet  Self No No   Sig: Take 1 tablet (5 mg total) by mouth every 6 (six) hours as needed for moderate pain for up to 10 doses Max Daily Amount: 20 mg      Facility-Administered Medications: None       No Known Allergies    PHYSICAL EXAM    PE limited by: Nothing    Objective   Vitals:   First set: Temperature: 98 °F (36.7 °C) (11/23/24 1219)  Pulse: 61 (11/23/24 1219)  Respirations: 20  (11/23/24 1219)  Blood Pressure: 144/71 (11/23/24 1219)  SpO2: 99 % (11/23/24 1219)    Primary Survey:   (A) Airway: Patent  (B) Breathing: Unlabored  (C) Circulation: Pulses:   normal  (D) Disabliity:  GCS Total:  15  (E) Expose:  Completed    Secondary Survey: (Click on Physical Exam tab above)  Physical Exam  Vitals and nursing note reviewed.   Constitutional:       General: He is not in acute distress.     Appearance: He is well-developed. He is not diaphoretic.   HENT:      Head: Normocephalic.     Eyes:      Conjunctiva/sclera: Conjunctivae normal.      Pupils: Pupils are equal, round, and reactive to light.      Comments: Atraumatic orbits   Neck:      Comments: c spine w/o midline stepoff or deformity or ttp  Cardiovascular:      Rate and Rhythm: Normal rate and regular rhythm.      Heart sounds: Normal heart sounds. No murmur heard.     No friction rub. No gallop.   Pulmonary:      Effort: Pulmonary effort is normal. No respiratory distress.      Breath sounds: Normal breath sounds. No wheezing or rales.   Chest:      Chest wall: No tenderness.   Abdominal:      General: Bowel sounds are normal. There is no distension.      Palpations: Abdomen is soft.      Tenderness: There is no abdominal tenderness. There is no guarding or rebound.   Musculoskeletal:         General: No tenderness or deformity. Normal range of motion.      Cervical back: Neck supple.      Comments: Pelvis stable   No midline spinal stepoff or deformity or ttp   Skin:     General: Skin is warm and dry.   Neurological:      Mental Status: He is alert and oriented to person, place, and time.      Comments: GCS 15          Cervical spine cleared by clinical criteria? Yes     Invasive Devices       Peripheral Intravenous Line  Duration             Peripheral IV 11/23/24 Right Antecubital <1 day                    Lab Results:   Results Reviewed       Procedure Component Value Units Date/Time    CBC and differential [[id number]]  (Abnormal)  Collected: 11/23/24 1409    Lab Status: Final result Specimen: Blood from Arm, Right Updated: 11/23/24 1415     WBC 8.93 Thousand/uL      RBC 5.33 Million/uL      Hemoglobin 15.2 g/dL      Hematocrit 47.8 %      MCV 90 fL      MCH 28.5 pg      MCHC 31.8 g/dL      RDW 14.5 %      MPV 11.3 fL      Platelets 148 Thousands/uL      nRBC 0 /100 WBCs      Segmented % 72 %      Immature Grans % 1 %      Lymphocytes % 19 %      Monocytes % 6 %      Eosinophils Relative 1 %      Basophils Relative 1 %      Absolute Neutrophils 6.52 Thousands/µL      Absolute Immature Grans 0.05 Thousand/uL      Absolute Lymphocytes 1.71 Thousands/µL      Absolute Monocytes 0.51 Thousand/µL      Eosinophils Absolute 0.09 Thousand/µL      Basophils Absolute 0.05 Thousands/µL     Basic metabolic panel [[id number]] Collected: 11/23/24 1409    Lab Status: In process Specimen: Blood from Arm, Right Updated: 11/23/24 1413    Protime-INR [[id number]] Collected: 11/23/24 1409    Lab Status: In process Specimen: Blood from Arm, Right Updated: 11/23/24 1412    APTT [[id number]] Collected: 11/23/24 1409    Lab Status: In process Specimen: Blood from Arm, Right Updated: 11/23/24 1412                   Imaging Studies:   Direct to CT: No  CT head without contrast   Final Result by Cecil Salazar MD (11/23 1353)      Tiny focus of acute subdural hemorrhage along the medial left tentorium cerebelli. No mass effect or midline shift.      No acute intraparenchymal hemorrhage.               I personally discussed this study with JIMMY NAJERA on 11/23/2024 1:53 PM.            Workstation performed: [id number]         XR chest 1 view portable    (Results Pending)         Procedures  Procedures         ED Course  ED Course as of 11/23/24 1436   Sat Nov 23, 2024   1400 Tiny focus of acute subdural hemorrhage along the medial left tentorium cerebelli. No mass effect or midline shift. Transfer process initiated. Labs and kcentra initiated.           Medical Decision  Making  CT of the head showing small subdural.  Patient anticoagulation reversed.  Transferred to [facility] for continued observation and treatment    Amount and/or Complexity of Data Reviewed  Labs: ordered.  Radiology: ordered.                Disposition  [facility] Transfer: No  Final diagnoses:   Hematoma of scalp, initial encounter   Subdural hematoma (HCC)     Time reflects when diagnosis was documented in both MDM as applicable and the Disposition within this note       Time User Action Codes Description Comment    11/23/2024  2:17 PM Francisco Crump Add [S00.03XA] Hematoma of scalp, initial encounter     11/23/2024  2:17 PM Francisco Crump Add [S06.5XAA] Subdural hematoma (HCC)           ED Disposition       ED Disposition   Transfer to Another Facility-In Network    Condition   --    Date/Time   Sat Nov 23, 2024  2:16 PM    Comment   Kaia Davis Becca should be transferred out to [facility].               MD Documentation      Flowsheet Row Most Recent Value   Patient Condition The patient has been stabilized such that within reasonable medical probability, no material deterioration of the patient condition or the condition of the unborn child(katie) is likely to result from the transfer   Reason for Transfer Level of Care needed not available at this facility  [Trauma]   Benefits of Transfer Specialized equipment and/or services available at the receiving facility (Include comment)________________________   Risks of Transfer Potential for delay in receiving treatment, Potential deterioration of medical condition, Loss of IV, Increased discomfort during transfer   Accepting Physician Chalo   Accepting Facility Name, City & State  [facility]    (Name & Tel number) [facility]   Transported by (Company and Unit #) [facility]   Sending MD Francisco BEVERLY, Abdulaziz Humphrey MD   Provider Certification General risk, such as traffic hazards, adverse weather conditions, rough terrain or turbulence, possible failure of  equipment (including vehicle or aircraft), or consequences of actions of persons outside the control of the transport personnel          RN Documentation      Flowsheet Row Most Recent Value   Accepting Facility Name, City & State  [facility]    (Name & Tel number) [facility]   Transported by (Company and Unit #) [facility]          Follow-up Information    None       Patient's Medications   Discharge Prescriptions    No medications on file     No discharge procedures on file.    PDMP Review       None            ED Provider  Electronically Signed by           RUSH Yoo  11/23/24 0292

## 2024-11-23 NOTE — H&P
"H&P - Trauma   Name: Kaia Burns 74 y.o. male I MRN: [id number]  Unit/Bed#: ED 06 I Date of Admission: 11/23/2024   Date of Service: 11/23/2024 I Hospital Day: 0     Assessment & Plan  Subdural hematoma (HCC)  Fall down ten steps at home. CT head demonstrates small acute left subdural hematoma at medial tentorium cerebelli.    Plan:  - Admit to stepdown 2   - HOT protocol  - appreciate neurosurgery recommendations  - Keppra 500mg bid 7 day course  - Pain and nausea control  - repeat CT head 11/24 AM    Type 2 diabetes mellitus (HCC)  Lab Results   Component Value Date    HGBA1C 6.4 (H) 08/15/2024     -Insulin sliding scale     No results for input(s): \"POCGLU\" in the last 72 hours.    Blood Sugar Average: Last 72 hrs:        History of Present Illness   Chief Complaint: headache  Mechanism: Fall     Kaia Burns is a 74 y.o. male who presents as a trauma transfer from [place] after sustaining a fall at home where he fell down ten steps. At baseline, he ambulates with a walker. He lives at home with his wife. Positive headstrike, negative LOC. Past medical history is significant for type 2 DM and CAD with pacemaker for which he takes Eliquis. He did not take his Eliquis this morning. CT head at [place] demonstrated an acute left subdural hematoma at the medial tentorium cerebelli. Eliquis was reversed prior to transfer to [place] for a higher level of care. The patient reports pain in the right side of his scalp that is dull. He denies dizziness, nausea, vomiting, and vision changes. No other complaints at this time.     Review of Systems  I have reviewed the patient's PMH, PSH, Social History, Family History, Meds, and Allergies  Immunization History   Administered Date(s) Administered    COVID-19 MODERNA VACC 0.5 ML IM 04/20/2021, 05/18/2021    COVID-19 Pfizer mRNA vacc PF masood-sucrose 12 yr and older (Comirnaty) 01/08/2024       1. Before the illness or injury that brought you to the " Emergency, did you need someone to help you on a regular basis? 0=No   2. Since the illness or injury that brought you to the Emergency, have you needed more help than usual to take care of yourself? 0=No   3. Have you been hospitalized for one or more nights during the past 6 months (excluding a stay in the Emergency Department)? 0=No   4. In general, do you see well? 0=Yes   5. In general, do you have serious problems with your memory? 0=No   6. Do you take more than three different medications everyday? 1=Yes   TOTAL   1     Did you order a geriatric consult if the score was 2 or greater?: no       Objective :  Temp:  [98 °F (36.7 °C)] 98 °F (36.7 °C)  HR:  [59-61] 60  BP: (122-163)/(71-89) 163/84  Resp:  [16-20] 16  SpO2:  [96 %-99 %] 98 %  O2 Device: None (Room air)    Initial Vitals:   Temperature: 98 °F (36.7 °C) (11/23/24 1612)  Pulse: 60 (11/23/24 1612)  Respirations: 16 (11/23/24 1612)  Blood Pressure: 163/84 (11/23/24 1612)    Primary Survey:   Airway:        Status: patent;        Pre-hospital Interventions: none        Hospital Interventions: none  Breathing:        Pre-hospital Interventions: none       Effort: normal       Right breath sounds: normal       Left breath sounds: normal  Circulation:        Rhythm: regular       Rate: regular   Right Pulses Left Pulses    R radial: 2+    R pedal: 2+     L radial: 2+    L pedal: 2+       Disability:        GCS: Eye: 4; Verbal: 5 Motor: 6 Total: 15       Right Pupil: round;  reactive         Left Pupil:  round;  reactive      R Motor Strength L Motor Strength    R : 5/5  R dorsiflex: 5/5  R plantarflex: 5/5 L : 5/5  L dorsiflex: 5/5  L plantarflex: 5/5          Exposure:       Completed: No      Secondary Survey:  Physical Exam  Constitutional:       Appearance: Normal appearance.   HENT:      Head: Normocephalic.      Comments: Closed right-sided posterior hematoma noted.     Mouth/Throat:      Mouth: Mucous membranes are moist.   Eyes:       Extraocular Movements: Extraocular movements intact.      Pupils: Pupils are equal, round, and reactive to light.   Cardiovascular:      Rate and Rhythm: Normal rate and regular rhythm.      Pulses: Normal pulses.   Pulmonary:      Effort: Pulmonary effort is normal.      Breath sounds: Normal breath sounds.   Abdominal:      General: There is no distension.      Palpations: Abdomen is soft.   Musculoskeletal:         General: Normal range of motion.      Cervical back: Normal range of motion. No tenderness.   Neurological:      General: No focal deficit present.      Mental Status: He is alert and oriented to person, place, and time.   Psychiatric:         Mood and Affect: Mood normal.             Lab Results: I have reviewed the following results:  Recent Labs     11/23/24  1409   WBC 8.93   HGB 15.2   HCT 47.8   *   SODIUM 138   K 4.4      CO2 30   BUN 12   CREATININE 0.83   GLUC 183*   PTT 29   INR 1.01       Imaging Results: I have personally reviewed pertinent images saved in PACS. CT scan findings (and other pertinent positive findings on images) were discussed with radiology. My interpretation of the images/reports are as follows:  Chest Xray(s): negative for acute findings   FAST exam(s): N/A   CT Scan(s): positive for acute findings: acute left subdural hematoma at medial tentorium cerebelli   Additional Xray(s): pending     Other Studies: Other Study Results Review: No additional pertinent studies reviewed.

## 2024-11-23 NOTE — ASSESSMENT & PLAN NOTE
"Lab Results   Component Value Date    HGBA1C 6.4 (H) 08/15/2024     -Insulin sliding scale     No results for input(s): \"POCGLU\" in the last 72 hours.    Blood Sugar Average: Last 72 hrs:      "

## 2024-11-24 ENCOUNTER — APPOINTMENT (INPATIENT)
Dept: RADIOLOGY | Facility: HOSPITAL | Age: 74
DRG: 084 | End: 2024-11-24
Payer: MEDICARE

## 2024-11-24 VITALS
DIASTOLIC BLOOD PRESSURE: 99 MMHG | HEART RATE: 60 BPM | WEIGHT: 283 LBS | BODY MASS INDEX: 43.03 KG/M2 | RESPIRATION RATE: 16 BRPM | OXYGEN SATURATION: 96 % | SYSTOLIC BLOOD PRESSURE: 166 MMHG | TEMPERATURE: 98 F

## 2024-11-24 PROBLEM — E66.01 MORBID (SEVERE) OBESITY DUE TO EXCESS CALORIES (HCC): Status: ACTIVE | Noted: 2024-11-24

## 2024-11-24 LAB
ANION GAP SERPL CALCULATED.3IONS-SCNC: 8 MMOL/L (ref 4–13)
BASOPHILS # BLD AUTO: 0.03 THOUSANDS/ΜL (ref 0–0.1)
BASOPHILS NFR BLD AUTO: 1 % (ref 0–1)
BUN SERPL-MCNC: 10 MG/DL (ref 5–25)
CALCIUM SERPL-MCNC: 9.2 MG/DL (ref 8.4–10.2)
CHLORIDE SERPL-SCNC: 105 MMOL/L (ref 96–108)
CO2 SERPL-SCNC: 29 MMOL/L (ref 21–32)
CREAT SERPL-MCNC: 0.83 MG/DL (ref 0.6–1.3)
EOSINOPHIL # BLD AUTO: 0.09 THOUSAND/ΜL (ref 0–0.61)
EOSINOPHIL NFR BLD AUTO: 1 % (ref 0–6)
ERYTHROCYTE [DISTWIDTH] IN BLOOD BY AUTOMATED COUNT: 14.5 % (ref 11.6–15.1)
EST. AVERAGE GLUCOSE BLD GHB EST-MCNC: 157 MG/DL
GFR SERPL CREATININE-BSD FRML MDRD: 87 ML/MIN/1.73SQ M
GLUCOSE SERPL-MCNC: 168 MG/DL (ref 65–140)
GLUCOSE SERPL-MCNC: 178 MG/DL (ref 65–140)
GLUCOSE SERPL-MCNC: 188 MG/DL (ref 65–140)
HBA1C MFR BLD: 7.1 %
HCT VFR BLD AUTO: 46.9 % (ref 36.5–49.3)
HGB BLD-MCNC: 15.3 G/DL (ref 12–17)
IMM GRANULOCYTES # BLD AUTO: 0.03 THOUSAND/UL (ref 0–0.2)
IMM GRANULOCYTES NFR BLD AUTO: 1 % (ref 0–2)
LYMPHOCYTES # BLD AUTO: 1.24 THOUSANDS/ΜL (ref 0.6–4.47)
LYMPHOCYTES NFR BLD AUTO: 19 % (ref 14–44)
MCH RBC QN AUTO: 29.3 PG (ref 26.8–34.3)
MCHC RBC AUTO-ENTMCNC: 32.6 G/DL (ref 31.4–37.4)
MCV RBC AUTO: 90 FL (ref 82–98)
MONOCYTES # BLD AUTO: 0.49 THOUSAND/ΜL (ref 0.17–1.22)
MONOCYTES NFR BLD AUTO: 8 % (ref 4–12)
NEUTROPHILS # BLD AUTO: 4.52 THOUSANDS/ΜL (ref 1.85–7.62)
NEUTS SEG NFR BLD AUTO: 70 % (ref 43–75)
NRBC BLD AUTO-RTO: 0 /100 WBCS
PLATELET # BLD AUTO: 130 THOUSANDS/UL (ref 149–390)
PMV BLD AUTO: 11.7 FL (ref 8.9–12.7)
POTASSIUM SERPL-SCNC: 4.1 MMOL/L (ref 3.5–5.3)
RBC # BLD AUTO: 5.22 MILLION/UL (ref 3.88–5.62)
SODIUM SERPL-SCNC: 142 MMOL/L (ref 135–147)
WBC # BLD AUTO: 6.4 THOUSAND/UL (ref 4.31–10.16)

## 2024-11-24 PROCEDURE — 97162 PT EVAL MOD COMPLEX 30 MIN: CPT

## 2024-11-24 PROCEDURE — 85025 COMPLETE CBC W/AUTO DIFF WBC: CPT

## 2024-11-24 PROCEDURE — 83036 HEMOGLOBIN GLYCOSYLATED A1C: CPT

## 2024-11-24 PROCEDURE — 82948 REAGENT STRIP/BLOOD GLUCOSE: CPT

## 2024-11-24 PROCEDURE — NC001 PR NO CHARGE: Performed by: SURGERY

## 2024-11-24 PROCEDURE — 99238 HOSP IP/OBS DSCHRG MGMT 30/<: CPT | Performed by: SURGERY

## 2024-11-24 PROCEDURE — 80048 BASIC METABOLIC PNL TOTAL CA: CPT

## 2024-11-24 PROCEDURE — 36415 COLL VENOUS BLD VENIPUNCTURE: CPT

## 2024-11-24 PROCEDURE — 97166 OT EVAL MOD COMPLEX 45 MIN: CPT

## 2024-11-24 PROCEDURE — 99223 1ST HOSP IP/OBS HIGH 75: CPT | Performed by: PHYSICIAN ASSISTANT

## 2024-11-24 PROCEDURE — 70450 CT HEAD/BRAIN W/O DYE: CPT

## 2024-11-24 RX ADMIN — ACETAMINOPHEN 975 MG: 325 TABLET, FILM COATED ORAL at 08:31

## 2024-11-24 RX ADMIN — LEVETIRACETAM 500 MG: 500 TABLET, FILM COATED ORAL at 08:28

## 2024-11-24 RX ADMIN — INSULIN LISPRO 2 UNITS: 100 INJECTION, SOLUTION INTRAVENOUS; SUBCUTANEOUS at 11:58

## 2024-11-24 RX ADMIN — METOPROLOL SUCCINATE 25 MG: 25 TABLET, EXTENDED RELEASE ORAL at 08:28

## 2024-11-24 RX ADMIN — INSULIN LISPRO 2 UNITS: 100 INJECTION, SOLUTION INTRAVENOUS; SUBCUTANEOUS at 08:28

## 2024-11-24 RX ADMIN — LISINOPRIL 20 MG: 20 TABLET ORAL at 08:28

## 2024-11-24 NOTE — ASSESSMENT & PLAN NOTE
Fall down ten steps at home. CT head demonstrates small acute left subdural hematoma at medial tentorium cerebelli. Repeat CT head 11/24 AM demonstrating stable SDH.     Plan:  - HOT protocol  - appreciate neurosurgery recommendations  - Continue Keppra 500mg bid 7 day course  - Pain and nausea control

## 2024-11-24 NOTE — DISCHARGE INSTR - AVS FIRST PAGE
Neurosurgery discharge instructions following traumatic head bleed:     Do not take any blood thinning medications (ie. No Advil. No motrin. No ibuprofen. No Aleve. No Aspirin. No fishoil. No heparin. No antiplatelet / no anticoagulation medication).  Refrain from activity that increases chance of trauma to head or falls. Recommend you take fall precaution.  No strenuous activity or sports.  Return to hospital Emergency Room if you experience worsening / new headache, nausea/vomiting, speech/vision change, seizure, confusion / mental status change, weakness, or other neurological changes.      Follow-up as scheduled with a repeat CT head without contrast to be completed 2-3 days prior to visit.  Prescription has been entered electronically.  Please call [phone] to schedule.

## 2024-11-24 NOTE — OCCUPATIONAL THERAPY NOTE
Occupational Therapy Evaluation     Patient Name: Kaia Burns  Today's Date: 11/24/2024  Problem List  Active Problems:    Subdural hematoma (HCC)    Type 2 diabetes mellitus (HCC)    Past Medical History  Past Medical History:   Diagnosis Date    Cancer (HCC)     lymphoma    Cardiac disease     pacemaker    Hyperlipidemia     Hypertension      Past Surgical History  Past Surgical History:   Procedure Laterality Date    CARDIAC PACEMAKER PLACEMENT           11/24/24 1050   OT Last Visit   OT Visit Date 11/24/24   Note Type   Note type Evaluation   Pain Assessment   Pain Assessment Tool 0-10   Pain Score No Pain   Patient's Stated Pain Goal No pain   Hospital Pain Intervention(s) Repositioned;Ambulation/increased activity;Emotional support   Restrictions/Precautions   Weight Bearing Precautions Per Order No   Other Precautions Chair Alarm;Bed Alarm;Fall Risk   Home Living   Type of Home House   Home Layout Multi-level;Bed/bath upstairs  (2ND FL SET-UP)   Bathroom Shower/Tub Tub/shower unit   Bathroom Toilet Standard   Additional Comments NO USE OF DME AT BASELINE   Prior Function   Level of [place] Independent with ADLs;Independent with functional mobility;Independent with IADLS   Lives With Spouse;Family   Receives Help From Family   IADLs Independent with driving;Independent with meal prep;Independent with medication management   Falls in the last 6 months 1 to 4  (1)   Vocational Retired   Lifestyle   [employer] PT REPORTS BEING I WITH ADLS/IADLS/DRIVING PTA.   Reciprocal Relationships LIVES WITH SUPPORTIVE SPOUSE + SON AND HIS FAMILY   Service to Others RETIRED; WORKED IN [employer]   Intrinsic Gratification ENJOYS SPENDING TIME WITH FAMILY.   ADL   Eating Assistance 7  Independent   Grooming Assistance 7  Independent   UB Bathing Assistance 7  Independent   LB Bathing Assistance 5  Supervision/Setup   UB Dressing Assistance 7  Independent   LB Dressing Assistance 5  Supervision/Setup   Toileting Assistance   5  Supervision/Setup   Functional Assistance 5  Supervision/Setup   Bed Mobility   Supine to Sit 6  Modified independent   Sit to Supine Unable to assess   Additional Comments PT LEFT OOB WITH ALL NEEDS IN REACH + CHAIR ALARM ACTIVATED.   Transfers   Sit to Stand 6  Modified independent   Stand to Sit 6  Modified independent   Functional Mobility   Functional Mobility 5  Supervision   Balance   Static Sitting Good   Static Standing Good   Ambulatory Fair +   Activity Tolerance   Activity Tolerance Patient tolerated treatment well   Medical Staff Made Aware PT SEEN FOR CO-EVAL WITH SKILLED PHYSICAL THERAPIST 2' TRAUAMTIC INJURIES, NEW PRECAUTIONS/LIMITATIONS, AND LIMITED ACTIVITY TOLERANCE WHICH IMPACT PERFORMANCE AND ARE A REGRESSION FROM PT'S BASELINE.   Nurse Made Aware APPROPRIATE TO SEE PER RN.   RUE Assessment   RUE Assessment WFL   LUE Assessment   LUE Assessment WFL   Hand Function   Gross Motor Coordination Functional   Fine Motor Coordination Functional   Psychosocial   Psychosocial (WDL) WDL   Cognition   Overall Cognitive Status WFL   Arousal/Participation Alert;Cooperative   Attention Within functional limits   Orientation Level Oriented X4   Memory Within functional limits   Following Commands Follows all commands and directions without difficulty   Comments PT IS PLEASANT AND COOPERATIVE. PT REPORTS NO ACUTE COGNITIVE CHANGES.   Assessment   Assessment 73 YO Male SEEN FOR INITIAL OCCUPATIONAL THERAPY EVALUATION FOLLOWING TXF FROM [facility] S/P FALL DOWN STEPS RESULTING IN +HS/-LOC. DX INCLUDES SMALL ACUTE L SDH. PROBLEMS LIST/PMH INCLUDES Cancer (HCC), Cardiac disease, Hyperlipidemia, and Hypertension. PT IS FROM HOME WITH FAMILY WHERE HE REPORTS BEING INDEPENDENT WITH ADLS/IADLS/DRIVING PTA. PT CURRENTLY FUNCTIONING AT AN INDEPENDENT- SUPERVISION LEVEL WITH ADLS, TRANSFERS AND FUNCTIONAL MOBILITY. PT IS EXPERIENCING EXPECTED LIMITATIONS 2' FATIGUE, IMPAIRED BALANCE, and OVERALL LIMITED ACTIVITY  TOLERANCE. PT EDUCATED ON DEEP BREATHING TECHNIQUES T/O ACTIVITY, SLOWING OF PACE, ENERGY CONSERVATION TECHNIQUES FOR CARRY OVER UPON D/C, INCREASED FAMILY SUPPORT, and CONTINUE PARTICIPATION IN SELF-CARE/MOBILITY WITH STAFF WHILE IN THE HOSPITAL . The patient's raw score on the AM-PAC Daily Activity Inpatient Short Form is 21. A raw score of greater than or equal to 19 suggests the patient may benefit from discharge to home. Please refer to the recommendation of the Occupational Therapist for safe discharge planning.  FROM AN OCCUPATIONAL THERAPY PERSPECTIVE, PT CAN RETURN HOME WITH INCREASED FAMILY SUPPORT WHEN MEDICALLY CLEARED. ALL QUESTIONS/CONCERNS ADDRESSED. NO ADDITIONAL ACUTE CARE OT NEEDS. D/C OT.   Goals   Patient Goals TO RETURN HOME   Discharge Recommendation   Rehab Resource Intensity Level, OT No post-acute rehabilitation needs   AM-PAC Daily Activity Inpatient   Lower Body Dressing 3   Bathing 3   Toileting 3   Upper Body Dressing 4   Grooming 4   Eating 4   Daily Activity Raw Score 21   Daily Activity Standardized Score (Calc for Raw Score >=11) 44.27   AM-PAC Applied Cognition Inpatient   Following a Speech/Presentation 4   Understanding Ordinary Conversation 4   Taking Medications 4   Remembering Where Things Are Placed or Put Away 4   Remembering List of 4-5 Errands 4   Taking Care of Complicated Tasks 4   Applied Cognition Raw Score 24   Applied Cognition Standardized Score 62.21     Documentation completed by NEYMAR Hou, OTR/L  [facility] Certified ID# [id number]

## 2024-11-24 NOTE — ASSESSMENT & PLAN NOTE
Lab Results   Component Value Date    HGBA1C 7.1 (H) 11/24/2024     -Insulin sliding scale     Recent Labs     11/24/24  0712   POCGLU 188*       Blood Sugar Average: Last 72 hrs:  (P) 188

## 2024-11-24 NOTE — CONSULTS
Consultation - Neurosurgery   Kaia Burns 74 y.o. male MRN: [id number]  Unit/Bed#: [facility] 804-01 Encounter: [id number]    Images reviewed at morning rounds on 11/24/24 at 11am  Patient was seen and examined on 11/24/24 at 12:20pm    Inpatient consult to Neurosurgery  Consult performed by: Juan Minaya PA-C  Consult ordered by: Elida Carter MD          Assessment & Plan               Assessment:  Small L tentorial cerebelli SDH  Hx of fall on Eliquis      Plan:   Exam: A&OX3, Marily, PERRL, EOMI X3 mm conj bilaterally, finger to nose dysmetric, otherwise no drift. Strength 5/5 and sensation to LT intact bilaterally. DTR 2+ no clonus bilat  Images:Small left tentorial cerebelli SDH, repeat CT head stable  Pain control: Per primary team  DVT ppx: SCDs bilat legs, okay with DVT ppx  Seizure ppx: Per trauma protocol  Activity:As tolerated  PT/OT evaluation & treatment  Medical Mx:Per primary team  Neurocheck: Close neuromonitoring, stat CT head if GCS drops 2 pts/1H  HOB>30-45 degree  sBP<160  Patient fel;l down flight of stairs and hit his head on the bottom wall, he takes Eliquis, CT head with small left tentorial SDH. Repeat CT stable. Patient without much complaints, mild right parietoccipital scalp pain, where he had small bump/lump, otherwise non focal. From NSX perspective, no procedure is anticipated at this juncture. Recommend continue monitoring since he was on AC. No AC/AP for 10-10 weeks. Follow up in two weeks with CT head. Call with question or concern. S/O.      History of Present Illness     HPI: Kaia Burns is a 74 y.o. male with PMHx of CAD and pacemaker placement on Eliquis, DM-2, came to Hospital after he fell down flight of stairs and had small left tentorial cerebelli SDH. Patient reported mild right parietoccipital scalp lump and pain which prompted him to come to Hospital. Denies headache, LOC, Seizures, Nausea, vomiting or vision issues. No speech or swallowing problem. Denies any  weakness in the extremities, numbness or paresthesia. No balance issues or gait instability at baseline. Patient denies any previous stroke, bleeding disorder. No Hx of Alchol ingestion.      Review of Systems   Constitutional:  Negative for activity change, chills and unexpected weight change.   HENT:  Negative for trouble swallowing and voice change.    Eyes:  Negative for photophobia and visual disturbance.   Gastrointestinal:  Negative for nausea and vomiting.   Genitourinary:  Negative for difficulty urinating.   Musculoskeletal:  Negative for gait problem and neck pain.   Neurological:  Negative for dizziness, tremors, seizures, syncope, facial asymmetry, speech difficulty, weakness, light-headedness, numbness and headaches.   Psychiatric/Behavioral:  Negative for confusion.        Historical Information   Past Medical History:   Diagnosis Date    Cancer (HCC)     lymphoma    Cardiac disease     pacemaker    Hyperlipidemia     Hypertension      Past Surgical History:   Procedure Laterality Date    CARDIAC PACEMAKER PLACEMENT       Social History     Substance and Sexual Activity   Alcohol Use Never     Social History     Substance and Sexual Activity   Drug Use Never     Social History     Tobacco Use   Smoking Status Never   Smokeless Tobacco Never     History reviewed. No pertinent family history.    Meds/Allergies   all current active meds have been reviewed and current meds:   Current Facility-Administered Medications:     acetaminophen (TYLENOL) tablet 975 mg, Q8H ARMANDO    atorvastatin (LIPITOR) tablet 40 mg, Daily With Dinner    fluticasone (FLONASE) 50 mcg/act nasal spray 1 spray, Q12H PRN    insulin lispro (HumALOG/ADMELOG) 100 units/mL subcutaneous injection 2-12 Units, TID AC **AND** Fingerstick Glucose (POCT), TID AC    levETIRAcetam (KEPPRA) tablet 500 mg, Q12H ARMANDO    lisinopril (ZESTRIL) tablet 20 mg, Daily    metoprolol succinate (TOPROL-XL) 24 hr tablet 25 mg, Daily    oxyCODONE (ROXICODONE)  immediate release tablet 10 mg, Q4H PRN    oxyCODONE (ROXICODONE) IR tablet 5 mg, Q6H PRN  No Known Allergies    Objective   I/O         11/22 0701 11/23 0700 11/23 0701 11/24 0700 11/24 0701 11/25 0700    P.O.   240    Total Intake(mL/kg)   240 (1.9)    Urine (mL/kg/hr)  0     Stool  0     Total Output  0     Net  0 +240           Unmeasured Urine Occurrence  1 x             Physical Exam  Constitutional:       Appearance: Normal appearance.   Eyes:      Extraocular Movements: Extraocular movements intact.      Pupils: Pupils are equal, round, and reactive to light.   Pulmonary:      Effort: Pulmonary effort is normal.   Musculoskeletal:         General: Normal range of motion.      Cervical back: Normal range of motion.   Neurological:      General: No focal deficit present.      Mental Status: He is alert and oriented to person, place, and time.      GCS: GCS eye subscore is 4. GCS verbal subscore is 5. GCS motor subscore is 6.      Cranial Nerves: Cranial nerves 2-12 are intact.      Sensory: Sensation is intact.      Motor: Motor function is intact.      Deep Tendon Reflexes: Reflexes are normal and symmetric.       [unfilled]    Vitals:Blood pressure 166/99, pulse 60, temperature 98 °F (36.7 °C), temperature source Oral, resp. rate 16, weight 128 kg (283 lb), SpO2 96%.,Body mass index is 43.03 kg/m².     Lab Results:   Results from last 7 days   Lab Units 11/24/24  0441 11/23/24  1409   WBC Thousand/uL 6.40 8.93   HEMOGLOBIN g/dL 15.3 15.2   HEMATOCRIT % 46.9 47.8   PLATELETS Thousands/uL 130* 148*   SEGS PCT % 70 72   MONO PCT % 8 6   EOS PCT % 1 1     Results from last 7 days   Lab Units 11/24/24  0441 11/23/24  1409 11/20/24  0850   POTASSIUM mmol/L 4.1 4.4 4.3   CHLORIDE mmol/L 105 103 101   CO2 mmol/L 29 30 31   BUN mg/dL 10 12 18   CREATININE mg/dL 0.83 0.83 0.97   CALCIUM mg/dL 9.2 9.1 9.8             Results from last 7 days   Lab Units 11/23/24  1409   INR  1.01   PTT seconds 29     No  "results found for: \"TROPONINT\"  ABG:No results found for: \"PHART\", \"SAM3NDF\", \"PO2ART\", \"WFD3XMM\", \"J7SKOPAY\", \"BEART\", \"SOURCE\"    Imaging Studies: Results Review Statement: I personally reviewed the following image studies in PACS and associated radiology reports: CT head. My interpretation of the radiology images/reports is: findings with small left tentorial cerebelli SDH and the findings reviewed with the patient..    EKG, Pathology, and Other Studies: Results Review Statement: No pertinent imaging studies reviewed.    VTE Prophylaxis: Sequential compression device (Venodyne)     Code Status: Level 1 - Full Code  Advance Directive and Living Will:      Power of :    POLST:      Counseling / Coordination of Care  I spent 20 minutes with the patient.    "

## 2024-11-24 NOTE — DISCHARGE SUMMARY
"Discharge Summary - Trauma Surgery   Kaia Davis Refai 74 y.o. male MRN: [id number]  Unit/Bed#: [facility] 804-01 Encounter: [id number]    Admission Date: 11/23/2024     Discharge Date:11/24/24    Admitting Diagnosis: Subdural hematoma (HCC) [S06.5XAA]  Unspecified multiple injuries, initial encounter [T07.XXXA]    Discharge Diagnosis: Subdural hematoma (HCC) [S06.5XAA]    Attending and Service: Dr. Kaufman    Consulting Service(s): Neurosurgery    Imaging and Procedures Performed: No orders of the defined types were placed in this encounter.      CT head wo contrast  Result Date: 11/24/2024  Impression: Stable thin left tentorial subdural hematoma measuring up to 4 mm. No new hemorrhage or significant mass effect. Workstation performed: [id number]     XR ankle 3+ vw left  Result Date: 11/24/2024  Impression: No acute osseous abnormality. Computerized Assisted Algorithm (CAA) may have been used to analyze all applicable images. Workstation performed: [id number]     XR chest 1 view portable  Result Date: 11/23/2024  Impression: No acute cardiopulmonary disease. Workstation performed: [id number]     CT head without contrast  Result Date: 11/23/2024  Impression: Tiny focus of acute subdural hemorrhage along the medial left tentorium cerebelli. No mass effect or midline shift. No acute intraparenchymal hemorrhage. I personally discussed this study with JIMMY NAJERA on 11/23/2024 1:53 PM. Workstation performed: [id number]     HPI: As per Dr. Carter, \"74 y.o. male who presents as a trauma transfer from [place] after sustaining a fall at home where he fell down ten steps. At baseline, he ambulates with a walker. He lives at home with his wife. Positive headstrike, negative LOC. Past medical history is significant for type 2 DM and CAD with pacemaker for which he takes Eliquis. He did not take his Eliquis this morning. CT head at [place] demonstrated an acute left subdural hematoma at the medial tentorium cerebelli. Eliquis " "was reversed prior to transfer to [place] for a higher level of care. The patient reports pain in the right side of his scalp that is dull. He denies dizziness, nausea, vomiting, and vision changes. No other complaints at this time.\"    Hospital Course:     The patient was admitted to stepdown 2. HOT protocol was initiated. Repeat CT head on 11/24 was stable. Neurosurgery was consulted and recommended 2 week follow up with no AC/AP for 10 weeks. Patient was seen by PT and OT and recommended for home discharge. The patient is instructed to follow the provided discharge instructions printed in the after visit summary.     Condition at Discharge: good     Discharge instructions/Information to patient and family:   See after visit summary for information provided to patient and family.      Provisions for Follow-Up Care:  See after visit summary for information related to follow-up care and any pertinent home health orders.      Disposition: Home    Planned Readmission: No    Discharge Medications:  See after visit summary for reconciled discharge medications provided to patient and family.     Medication List        CONTINUE taking these medications      atorvastatin 40 mg tablet  Commonly known as: LIPITOR     fluticasone 50 mcg/act nasal spray  Commonly known as: FLONASE     lisinopril 20 mg tablet  Commonly known as: ZESTRIL     metFORMIN 1000 MG tablet  Commonly known as: GLUCOPHAGE     metoprolol succinate 25 mg 24 hr tablet  Commonly known as: TOPROL-XL            ASK your doctor about these medications      oxyCODONE 5 immediate release tablet  Commonly known as: Roxicodone  Take 1 tablet (5 mg total) by mouth every 6 (six) hours as needed for moderate pain for up to 10 doses Max Daily Amount: 20 mg              "

## 2024-11-24 NOTE — PROGRESS NOTES
Progress Note - Trauma   Name: Kaia Davis Refai 74 y.o. male I MRN: [id number]  Unit/Bed#: [facility] 804-01 I Date of Admission: 11/23/2024   Date of Service: 11/24/2024 I Hospital Day: 1    Assessment & Plan  Subdural hematoma (HCC)  Fall down ten steps at home. CT head demonstrates small acute left subdural hematoma at medial tentorium cerebelli. Repeat CT head 11/24 AM demonstrating stable SDH.     Plan:  - HOT protocol  - appreciate neurosurgery recommendations  - Continue Keppra 500mg bid 7 day course  - Pain and nausea control    Type 2 diabetes mellitus (HCC)  Lab Results   Component Value Date    HGBA1C 7.1 (H) 11/24/2024     -Insulin sliding scale     Recent Labs     11/24/24  0712   POCGLU 188*       Blood Sugar Average: Last 72 hrs:  (P) 188      VTE Prophylaxis: Reason for no pharmacologic prophylaxis pending neurosurgery clearance for DVT ppx      Disposition: Home when medically stable      24 Hour Events : None.  Subjective : Pain well-controlled. Voiding appropriately. No complaints at this time.     Objective :  Temp:  [98 °F (36.7 °C)] 98 °F (36.7 °C)  HR:  [59-68] 60  BP: (113-175)/() 166/99  Resp:  [16-20] 16  SpO2:  [92 %-99 %] 96 %  O2 Device: None (Room air)    I/O         11/22 0701  11/23 0700 11/23 0701  11/24 0700 11/24 0701  11/25 0700    Urine (mL/kg/hr)  0     Stool  0     Total Output  0     Net  0            Unmeasured Urine Occurrence  1 x             Physical Exam   Constitutional:       Appearance: Normal appearance.   HENT:      Head: Normocephalic.      Comments: Closed right-sided posterior hematoma noted.     Mouth/Throat:      Mouth: Mucous membranes are moist.   Eyes:      Extraocular Movements: Extraocular movements intact.      Pupils: Pupils are equal, round, and reactive to light.   Cardiovascular:      Rate and Rhythm: Normal rate and regular rhythm.      Pulses: Normal pulses.   Pulmonary:      Effort: Pulmonary effort is normal.      Breath sounds: Normal breath  sounds.   Abdominal:      General: There is no distension.      Palpations: Abdomen is soft.   Musculoskeletal:         General: Normal range of motion.      Cervical back: Normal range of motion. No tenderness.   Neurological:      General: No focal deficit present.      Mental Status: He is alert and oriented to person, place, and time.   Psychiatric:         Mood and Affect: Mood normal.         Lab Results: I have reviewed the following results:  Recent Labs     11/23/24  1409 11/24/24  0441   WBC 8.93 6.40   HGB 15.2 15.3   HCT 47.8 46.9   * 130*   SODIUM 138 142   K 4.4 4.1    105   CO2 30 29   BUN 12 10   CREATININE 0.83 0.83   GLUC 183* 178*   PTT 29  --    INR 1.01  --        Imaging Results Review: I reviewed radiology reports from this admission including: CT head.  Other Study Results Review: No additional pertinent studies reviewed.

## 2024-11-24 NOTE — PLAN OF CARE

## 2024-11-24 NOTE — PHYSICAL THERAPY NOTE
Physical Therapy Evaluation     Patient's Name: Kaia Burns    Admitting Diagnosis  Subdural hematoma (HCC) [S06.5XAA]  Unspecified multiple injuries, initial encounter [T07.XXXA]    Problem List  Patient Active Problem List   Diagnosis    Subdural hematoma (HCC)    Type 2 diabetes mellitus (HCC)       Past Medical History  Past Medical History:   Diagnosis Date    Cancer (HCC)     lymphoma    Cardiac disease     pacemaker    Hyperlipidemia     Hypertension        Past Surgical History  Past Surgical History:   Procedure Laterality Date    CARDIAC PACEMAKER PLACEMENT            11/24/24 1101   PT Last Visit   PT Visit Date 11/24/24   Note Type   Note type Evaluation   Pain Assessment   Pain Assessment Tool 0-10   Pain Score No Pain   Restrictions/Precautions   Weight Bearing Precautions Per Order No   Home Living   Type of Home House   Home Layout Multi-level;Stairs to enter with rails;Bed/bath upstairs  (2STE)   Bathroom Shower/Tub Tub/shower unit   Bathroom Toilet Standard   Bathroom Accessibility Accessible   Home Equipment   (denies)   Prior Function   Level of [place] Independent with ADLs;Independent with functional mobility;Independent with IADLS   Lives With Spouse;Son  (+grandchildren)   Receives Help From Family   IADLs Independent with driving;Independent with meal prep;Independent with medication management   Falls in the last 6 months 1 to 4  (1 leading to admission)   Vocational Retired   General   Family/Caregiver Present No   Cognition   Overall Cognitive Status WFL   Arousal/Participation Alert   Orientation Level Oriented X4   Memory Within functional limits   Following Commands Follows all commands and directions without difficulty   Subjective   Subjective pt pleasant and cooperative throughout therapy session. pt received supine in bed   RLE Assessment   RLE Assessment WFL   LLE Assessment   LLE Assessment WFL   Bed Mobility   Supine to Sit 6  Modified independent   Sit to Supine  Unable to assess   Transfers   Sit to Stand 6  Modified independent   Stand to Sit 6  Modified independent   Additional Comments transfers w/o AD   Ambulation/Elevation   Gait pattern Improper Weight shift;Antalgic;Decreased L stance;Inconsistent carina;Short stride  (denies pain, reports this is normal gait)   Gait Assistance 5  Supervision   Additional items Verbal cues   Assistive Device None   Distance 150'   Stair Management Assistance Not tested   Balance   Static Sitting Good   Dynamic Sitting Good   Static Standing Good   Dynamic Standing Fair +   Ambulatory Fair   Endurance Deficit   Endurance Deficit Yes   Endurance Deficit Description SOB, ZAPATA   Activity Tolerance   Activity Tolerance Patient limited by fatigue   Medical Staff Made Aware OT Chise, co-eval due to medical complexity and multiple comorbidities   Nurse Made Aware RN cleared and updated   Assessment   Prognosis Good   Problem List Decreased endurance;Decreased mobility   Assessment Pt seen for moderate (evolving) complexity PT evaluation. Moderate eval due to Ongoing medical management for primary dx, Decreased activity tolerance compared to baseline, Fall risk, Continuous pulse oximetry monitoring  Patient is a 74 y.o. male  who was admitted to [facility] on 11/23/2024  with SDH . Pt presents to [facility] following a fall at home .  At baseline, pt resides with spouse in house and was independent prior to hospital admission. Currently, upon initial examination, pt  is requiring  modified independent   for bed mobility skills;  modified independent  for functional transfers and  supervision   for ambulation with no AD. Pt was left seated at the end of PT session with all needs in reach. Pt with no questions or concerns regarding d/c home; appears to be functioning at/ near baseline mobility levels. Pt with no further acute inpatient PT needs at this time- please re-consult if needed. PT to discharge pt at this time. Encourage pt to  ambulate at least 3-4x/day with restorative/ nursing staff while remaining in hospital. The patient's AM-PAC Basic Mobility Inpatient Short Form Raw Score is 23, Standardized Score is 50.88. Based on AM-PAC scoring and patient presentation, PT currently recommending No Post Acute Rehab Needs. Please also refer to the recommendation of the Physical Therapist for safe discharge planning.   Barriers to Discharge None   Goals   Patient Goals to go home   Plan   Treatment/Interventions   (eval only, dc IPPT)   Discharge Recommendation   Rehab Resource Intensity Level, PT No post-acute rehabilitation needs   AM-PAC Basic Mobility Inpatient   Turning in Flat Bed Without Bedrails 4   Lying on Back to Sitting on Edge of Flat Bed Without Bedrails 4   Moving Bed to Chair 4   Standing Up From Chair Using Arms 4   Walk in Room 4   Climb 3-5 Stairs With Railing 3   Basic Mobility Inpatient Raw Score 23   Basic Mobility Standardized Score 50.88   [facility] Highest Level Of Mobility   JH-HLM Goal 7: Walk 25 feet or more   JH-HLM Achieved 7: Walk 25 feet or more   End of Consult   Patient Position at End of Consult Bedside chair;All needs within reach;Bed/Chair alarm activated         Filemon Lopes, PT

## 2024-11-25 NOTE — UTILIZATION REVIEW
Initial Clinical Review    Admission: Date/Time/Statement:   Admission Orders (From admission, onward)       Ordered        11/23/24 1645  Inpatient Admission  Once                          Orders Placed This Encounter   Procedures    Inpatient Admission     Standing Status:   Standing     Number of Occurrences:   1     Level of Care:   Level 2 Stepdown / HOT [14]     Estimated length of stay:   Not Applicable     ED Arrival Information       Expected   11/23/2024     Arrival   11/23/2024 15:58    Acuity   Emergent              Means of arrival   Ambulance    Escorted by   [facility] ([facility])    Service   Trauma    Admission type   Emergency              Arrival complaint   Trauma             Chief Complaint   Patient presents with    Fall     Fall on thinners       Initial Presentation: 74 y.o. male with PMHx includes type 2 DM and CAD with pacemaker for which he takes Eliquis, who presented initially to [facility] then transferred to [facility], admitted Inpatient status dt Subdural hematoma.  Presented after sustaining a fall at home where he fell down ten steps. At baseline, he ambulates with a walker and lives at home with his wife. Positive headstrike, negative LOC. CT head demonstrated an acute left subdural hematoma at the medial tentorium cerebelli. Eliquis was reversed prior to transfer to [place] for a higher level of care. The patient reports pain in the right side of his scalp that is dull.     Plan:  Admit to Level 2 stepdown unit :  Neurosurgery consult, neuro checks, Keppra, analgesia and antiemetics prn,  repeat CT head on 11/24, start accuchecks w/ SSI,     Date: 11/24   Day 2: Per Neurosurgery: Repeat CT stable. Patient without much complaints, mild right parietoccipital scalp pain, where he had small bump/lump, otherwise non focal. From NSX perspective, no procedure is anticipated at this juncture. Recommend continue monitoring since he was on AC. No AC/AP for 10-10 weeks.  Follow up in two weeks with CT head. PT OT eval. Pt discharged to home.     ED Treatment-Medication Administration from 11/23/2024 1423 to 11/24/2024 0552         Date/Time Order Dose Route Action     11/23/2024 1815 atorvastatin (LIPITOR) tablet 40 mg 40 mg Oral Given     11/23/2024 1815 acetaminophen (TYLENOL) tablet 975 mg 975 mg Oral Given     11/23/2024 2128 levETIRAcetam (KEPPRA) tablet 500 mg 500 mg Oral Given            Medications 11/23 11/24   acetaminophen (TYLENOL) tablet 975 mg  Dose: 975 mg  Freq: Every 8 hours scheduled Route: PO  Start: 11/23/24 1730 End: 11/24/24 1723 1815 2200 [C]      0831     (4998)     1723-D/C'd      atorvastatin (LIPITOR) tablet 40 mg  Dose: 40 mg  Freq: Daily with dinner Route: PO  Start: 11/23/24 1715 End: 11/24/24 1723 1815      1723-D/C'd       insulin lispro (HumALOG/ADMELOG) 100 units/mL subcutaneous injection 2-12 Units  Dose: 2-12 Units  Freq: 3 times daily before meals Route: SC  Start: 11/24/24 0700 End: 11/24/24 1723   Admin Instructions:        0828     1158     1723-D/C'd      levETIRAcetam (KEPPRA) tablet 500 mg  Dose: 500 mg  Freq: Every 12 hours scheduled Route: PO  Start: 11/23/24 2100 End: 11/24/24 1723   Admin Instructions:       2128      0828     1723-D/C'd      lisinopril (ZESTRIL) tablet 20 mg  Dose: 20 mg  Freq: Daily Route: PO  Start: 11/24/24 0900 End: 11/24/24 1723   Admin Instructions:      Order specific questions:        0828     1723-D/C'd      metFORMIN (GLUCOPHAGE) tablet 1,000 mg  Dose: 1,000 mg  Freq: 2 times daily with meals Route: PO  Start: 11/23/24 1715 End: 11/23/24 1701   Admin Instructions:       1701-D/C'd       metoprolol succinate (TOPROL-XL) 24 hr tablet 25 mg  Dose: 25 mg  Freq: Daily Route: PO  Start: 11/24/24 0900 End: 11/24/24 1723   Admin Instructions:      Order specific questions:        0828     1723-D/C'd      prothrombin complex concentrate (human) (Kcentra) 2,000 Units  Dose: 2,000 Units  Freq: Once Route:  IV  Start: 11/23/24 1415 End: 11/23/24 1424   Admin Instructions:      Order specific questions:       1424         Legend:       [id number]/1811/1911/2011/2111/2211/2311/24        Continuous Meds none    PRN Meds Sorted by Name  for Kaia Burns as of 11/15/24 through 11/24/24  Legend:       Medications 11/23 11/24   fluticasone (FLONASE) 50 mcg/act nasal spray 1 spray  Dose: 1 spray  Freq: Every 12 hours PRN Route: EACH NARE  PRN Reason: rhinitis  Start: 11/23/24 1700 End: 11/24/24 1723   Admin Instructions:        1723-D/C'd      oxyCODONE (ROXICODONE) immediate release tablet 10 mg  Dose: 10 mg  Freq: Every 4 hours PRN Route: PO  PRN Reason: severe pain  Start: 11/23/24 1725 End: 11/24/24 1723   Admin Instructions:        1723-D/C'd      oxyCODONE (ROXICODONE) IR tablet 5 mg  Dose: 5 mg  Freq: Every 6 hours PRN Route: PO  PRN Reason: moderate pain  Start: 11/23/24 1701 End: 11/24/24 1723   Admin Instructions:        1723-D/C'd        ED Triage Vitals   Temperature Pulse Respirations Blood Pressure SpO2 Pain Score   11/23/24 1612 11/23/24 1612 11/23/24 1612 11/23/24 1612 11/23/24 1612 11/23/24 1613   98 °F (36.7 °C) 60 16 163/84 98 % No Pain     Weight (last 2 days) before discharge       Date/Time Weight    11/23/24 1758 128 (283)            Vital Signs (last 3 days) before discharge       Date/Time Temp Pulse Resp BP MAP (mmHg) SpO2 O2 Device Patient Position - Orthostatic VS Trena Coma Scale Score Pain    11/24/24 1101 -- -- -- -- -- -- -- -- -- No Pain    11/24/24 1050 -- -- -- -- -- -- -- -- -- No Pain    11/24/24 0900 -- -- -- -- -- -- -- -- 15 --    11/24/24 0831 -- -- -- -- -- -- -- -- -- No Pain    11/24/24 0800 -- -- -- -- -- -- None (Room air) -- 15 No Pain    11/24/24 06:16:07 -- 60 -- 166/99 121 96 % -- -- -- --    11/24/24 06:03:25 -- 60 -- 175/101 126 98 % -- -- -- --    11/24/24 0552 -- -- -- -- -- -- None (Room air) -- 15 No Pain    11/24/24 0400 -- 60 16 142/80 -- 95 %  None (Room air) Lying 15 --    11/24/24 0300 -- 60 18 160/85 -- 94 % None (Room air) Lying 15 --    11/24/24 0200 -- 60 -- -- -- 95 % -- -- 15 --    11/24/24 0100 -- 60 17 134/64 -- 95 % None (Room air) -- 15 --    11/24/24 0029 -- 60 16 141/84 106 96 % None (Room air) Lying -- No Pain    11/24/24 0000 -- 61 16 137/63 -- 94 % None (Room air) -- 15 --    11/23/24 2300 -- 61 -- 126/60 -- 92 % None (Room air) Lying 15 --    11/23/24 2200 -- 60 16 138/69 -- 96 % None (Room air) Lying 15 --    11/23/24 2100 -- 68 16 -- -- 98 % -- -- 15 --    11/23/24 2000 -- 60 16 113/56 -- 96 % None (Room air) Lying 15 --    11/23/24 1900 -- 60 -- 153/93 -- 98 % -- -- 15 No Pain    11/23/24 1800 -- 60 16 140/75 -- 96 % None (Room air) Lying 15 No Pain    11/23/24 1700 -- 60 16 131/69 -- 97 % None (Room air) Lying 15 No Pain    11/23/24 16:13:18 -- -- -- -- -- -- -- -- -- No Pain    11/23/24 16:12:34 98 °F (36.7 °C) 60 16 163/84 -- 98 % None (Room air) Lying 15 --              Pertinent Labs/Diagnostic Test Results:   Radiology:  CT head wo contrast   Final Interpretation by E. Alec Schoenberger, MD (11/24 2157)      Stable thin left tentorial subdural hematoma measuring up to 4 mm. No new hemorrhage or significant mass effect.                  Workstation performed: [id number]         XR ankle 3+ vw left   Final Interpretation by Darby Boggs MD (11/24 3230)      No acute osseous abnormality.         Computerized Assisted Algorithm (CAA) may have been used to analyze all applicable images.               Workstation performed: [id number]         CT head wo contrast    (Results Pending)     Cardiology:  No orders to display     GI:  No orders to display           Results from last 7 days   Lab Units 11/24/24  0441 11/23/24  1409   WBC Thousand/uL 6.40 8.93   HEMOGLOBIN g/dL 15.3 15.2   HEMATOCRIT % 46.9 47.8   PLATELETS Thousands/uL 130* 148*   TOTAL NEUT ABS Thousands/µL 4.52 6.52         Results from last 7 days   Lab Units  11/24/24  0441 11/23/24  1409 11/20/24  0850   SODIUM mmol/L 142 138 143   POTASSIUM mmol/L 4.1 4.4 4.3   CHLORIDE mmol/L 105 103 101   CO2 mmol/L 29 30 31   ANION GAP mmol/L 8 5 11   BUN mg/dL 10 12 18   CREATININE mg/dL 0.83 0.83 0.97   EGFR ml/min/1.73sq m 87 87 82   CALCIUM mg/dL 9.2 9.1 9.8         Results from last 7 days   Lab Units 11/24/24  1056 11/24/24  0712   POC GLUCOSE mg/dl 168* 188*     Results from last 7 days   Lab Units 11/24/24  0441 11/23/24  1409 11/20/24  0850   GLUCOSE RANDOM mg/dL 178* 183* 159*         Results from last 7 days   Lab Units 11/24/24  0441   HEMOGLOBIN A1C % 7.1*   EAG mg/dl 157     Results from last 7 days   Lab Units 11/23/24  1409   PROTIME seconds 14.0   INR  1.01   PTT seconds 29     Past Medical History:   Diagnosis Date    Cancer (HCC)     lymphoma    Cardiac disease     pacemaker    Hyperlipidemia     Hypertension      Present on Admission:  **None**      Admitting Diagnosis: Subdural hematoma (HCC) [S06.5XAA]  Unspecified multiple injuries, initial encounter [T07.XXXA]  Age/Sex: 74 y.o. male    Network Utilization Review Department  ATTENTION: Please call with any questions or concerns to [phone] and carefully listen to the prompts so that you are directed to the right person. All voicemails are confidential.   For Discharge needs, contact Care Management DC Support Team at [phone] opt. 2  Send all requests for admission clinical reviews, approved or denied determinations and any other requests to dedicated fax number below belonging to the campus where the patient is receiving treatment. List of dedicated fax numbers for the Facilities:  FACILITY NAME UR FAX NUMBER   ADMISSION DENIALS (Administrative/Medical Necessity) [phone]   DISCHARGE SUPPORT TEAM (NETWORK) [phone]   PARENT CHILD HEALTH (Maternity/NICU/Pediatrics) [phone]   [facility] [phone]   [facility] [phone]   [facility]  [facility] [phone]   [facility] [phone]   [facility] [phone]   [facility] [phone]   [facility] [phone]   [facility] [phone]   [facility] [phone]   [facility] [phone]   [facility] [phone]   [facility] [phone]

## 2024-11-27 ENCOUNTER — TELEPHONE (OUTPATIENT)
Dept: NEUROSURGERY | Facility: CLINIC | Age: 74
End: 2024-11-27

## 2024-11-27 NOTE — TELEPHONE ENCOUNTER
Received transferred call from POD. Spoke to patient and daughter. Scheduled for 2 wk hosp f/u on 12/13 after CT head on 12/11

## 2024-12-11 ENCOUNTER — HOSPITAL ENCOUNTER (OUTPATIENT)
Dept: CT IMAGING | Facility: CLINIC | Age: 74
Discharge: HOME | End: 2024-12-11
Payer: MEDICARE

## 2024-12-11 DIAGNOSIS — S06.5XAA SUBDURAL HEMATOMA (HCC): ICD-10-CM

## 2024-12-11 PROCEDURE — 70450 CT HEAD/BRAIN W/O DYE: CPT

## 2024-12-13 ENCOUNTER — OFFICE VISIT (OUTPATIENT)
Dept: NEUROSURGERY | Facility: CLINIC | Age: 74
End: 2024-12-13
Payer: MEDICARE

## 2024-12-13 VITALS
RESPIRATION RATE: 20 BRPM | HEIGHT: 68 IN | TEMPERATURE: 98.3 F | HEART RATE: 60 BPM | BODY MASS INDEX: 19.4 KG/M2 | SYSTOLIC BLOOD PRESSURE: 118 MMHG | OXYGEN SATURATION: 99 % | WEIGHT: 128 LBS | DIASTOLIC BLOOD PRESSURE: 70 MMHG

## 2024-12-13 DIAGNOSIS — S06.5XAA SDH (SUBDURAL HEMATOMA) (HCC): Primary | ICD-10-CM

## 2024-12-13 PROCEDURE — 99213 OFFICE O/P EST LOW 20 MIN: CPT | Performed by: PHYSICIAN ASSISTANT

## 2024-12-13 RX ORDER — MONTELUKAST SODIUM 10 MG/1
10 TABLET ORAL
COMMUNITY

## 2024-12-13 NOTE — PROGRESS NOTES
Name: Kaia Burns      : 1950      MRN: [id number]  Encounter Provider: Juan Minaya PA-C  Encounter Date: 2024   Encounter department: [facility] NEUROSURGICAL [facility]  :  Assessment & Plan    Patient is a 74 yrs old gentleman with PMHx of DM-2, S/P cardiac pacemaker placement in  on Eliquis and traumatic left tentoril Cerebelli SDH. Hx of fall about two weeks ago.    Patient reports feeling better, denies headache, seizure, nausea, vomiting, vision issues, speech or swallowing problem. No weakness , numbness or paresthesia in the extremities. No gait problem or Bowel or bladder dysfunction.    Patient had follow up CT head and images shows Near complete resolution of the previously seen left tentorial subdural hemorrhage with trace blood products remaining. Suspicion of a new thin right convexity subdural hygroma since 2024. Close continued interval surveillance is recommended.    Exam-Patient A&OX3, PERRL, EOMI 3 mm conj bilaterally Marily, Finger to nose test normal and without drift bilaterally. Sensation to LT intact throughout. DTR 2+, no clonus bilaterally.     Imagings:                           See details above     Plan:        Images shows improvement of previous SDH at left tentorial cerebelli and suspected new cerebral Convexity  Subdural hygroma  I would recommend one more surveillance images , CT head in 10-15 days  Continue with holding AC/AP  F/U in two weeks with CT head  Call with question or concern.            History of Present Illness   See Discussion above    ROS personally reviewed and updated as follows:  Review of Systems   Constitutional: Negative.    HENT: Negative.     Eyes: Negative.    Respiratory: Negative.     Cardiovascular: Negative.    Gastrointestinal: Negative.    Endocrine: Negative.    Genitourinary: Negative.    Musculoskeletal: Negative.    Skin: Negative.    Allergic/Immunologic: Negative.    Neurological: Negative.         F/u on  SDH-- [facility] on 12/11/24   S/p fell down steps at home on 11/23/24     Hematological: Negative.    Psychiatric/Behavioral: Negative.     All other systems reviewed and are negative.   I have personally reviewed the MA's review of systems and made changes as necessary.         Objective   There were no vitals taken for this visit.    Physical Exam  Constitutional:       Appearance: Normal appearance.   HENT:      Head: Normocephalic and atraumatic.   Eyes:      General: Lids are normal.      Extraocular Movements: Extraocular movements intact.      Pupils: Pupils are equal, round, and reactive to light.   Pulmonary:      Effort: Pulmonary effort is normal.   Musculoskeletal:         General: Normal range of motion.      Cervical back: Normal range of motion.   Neurological:      General: No focal deficit present.      Mental Status: He is oriented to person, place, and time.      Motor: Motor strength is normal.     Deep Tendon Reflexes: Reflexes are normal and symmetric.   Psychiatric:         Speech: Speech normal.       Neurological Exam  Mental Status  Awake, alert and oriented to person, place and time. Oriented to person, place, time and situation. Oriented to person, place, and time. Speech is normal. Language is fluent with no aphasia.    Cranial Nerves  CN II: Visual acuity is normal. Visual fields full to confrontation.  CN III, IV, VI: Extraocular movements intact bilaterally. Normal lids and orbits bilaterally. Pupils equal round and reactive to light bilaterally.  CN V: Facial sensation is normal.  CN VII: Full and symmetric facial movement.  CN VIII: Hearing is normal.  CN IX, X: Palate elevates symmetrically. Normal gag reflex.  CN XI: Shoulder shrug strength is normal.  CN XII: Tongue midline without atrophy or fasciculations.    Motor  Normal muscle bulk throughout. No fasciculations present. Normal muscle tone. No abnormal involuntary movements. Strength is 5/5 throughout all four  extremities.    Sensory  Light touch is normal in upper and lower extremities.     Reflexes  Deep tendon reflexes are 2+ and symmetric in all four extremities.    Right pathological reflexes: Remington's absent. Ankle clonus absent.  Left pathological reflexes: Remington's absent. Ankle clonus absent.    Coordination  Right: Finger-to-nose normal.Left: Finger-to-nose normal.      Radiology Results Review: I personally reviewed the following image studies in PACS and associated radiology reports: CT head. My interpretation of the radiology images/reports is: I reviewed images with the patient and findings as described in the assessment section above.    Administrative Statements   I have spent a total time of 30 minutes in caring for this patient on the day of the visit/encounter including Diagnostic results, Prognosis, Risks and benefits of tx options, Instructions for management, Patient and family education, Importance of tx compliance, Risk factor reductions, Impressions, Documenting in the medical record, Reviewing / ordering tests, medicine, procedures  , and Obtaining or reviewing history  .

## 2024-12-27 ENCOUNTER — HOSPITAL ENCOUNTER (OUTPATIENT)
Dept: CT IMAGING | Facility: CLINIC | Age: 74
Discharge: HOME | End: 2024-12-27
Payer: MEDICARE

## 2024-12-27 DIAGNOSIS — S06.5XAA SDH (SUBDURAL HEMATOMA) (HCC): ICD-10-CM

## 2024-12-27 PROCEDURE — 70450 CT HEAD/BRAIN W/O DYE: CPT

## 2024-12-30 ENCOUNTER — TELEPHONE (OUTPATIENT)
Age: 74
End: 2024-12-30

## 2024-12-31 ENCOUNTER — OFFICE VISIT (OUTPATIENT)
Dept: NEUROSURGERY | Facility: CLINIC | Age: 74
End: 2024-12-31
Payer: MEDICARE

## 2024-12-31 VITALS
HEIGHT: 68 IN | HEART RATE: 81 BPM | WEIGHT: 128 LBS | BODY MASS INDEX: 19.4 KG/M2 | DIASTOLIC BLOOD PRESSURE: 72 MMHG | OXYGEN SATURATION: 98 % | SYSTOLIC BLOOD PRESSURE: 120 MMHG | TEMPERATURE: 97.3 F

## 2024-12-31 DIAGNOSIS — S06.5XAA SDH (SUBDURAL HEMATOMA) (HCC): Primary | ICD-10-CM

## 2024-12-31 PROCEDURE — 99213 OFFICE O/P EST LOW 20 MIN: CPT | Performed by: PHYSICIAN ASSISTANT

## 2024-12-31 RX ORDER — FUROSEMIDE 20 MG/1
1 TABLET ORAL DAILY
COMMUNITY
Start: 2024-12-19

## 2024-12-31 NOTE — PROGRESS NOTES
Name: Kaia Burns      : 1950      MRN: [id number]  Encounter Provider: Juan Minaya PA-C  Encounter Date: 2024   [facility]  :  Assessment & Plan    Patient is a 74 yrs old gentleman with PMHx of DM-2, S/P cardiac pacemaker placement in  on Eliquis and traumatic left tentoril Cerebelli SDH. Hx of fall about 4-5 weeks ago on Eliquis for his cardiac pacemaker.     Patient reports feeling better, denies headache, seizure, nausea, vomiting, vision issues, speech or swallowing problem. No weakness , numbness or paresthesia in the extremities. No gait problem or Bowel or bladder dysfunction.     Patient had follow up CT head and images shows decreased conspicuity of  the previously seen left tentorial subdural hemorrhage with trace blood products remaining. And decreased thin right convexity subdural hygroma since 2024.  interval clinical and image surveillance is recommended.     Exam-Patient A&OX3, PERRL, EOMI 3 mm conj bilaterally Marily, Finger to nose test normal and without drift bilaterally. Sensation to LT intact throughout. DTR 2+, no clonus bilaterally.slight unstable gait uses cane for safety.    Imagings:      Findings as described above    Plan:  CT head wo contrast in 2 weeks  No AC/AP if possible until next images  Fall precaution  Avoid lifting heavy objects,or strenuous activities.     History of Present Illness   See discussion above    ROS personally reviewed and updated as follows:  Review of Systems   Constitutional: Negative.    HENT:  Positive for tinnitus (buzzing noise in B/L ears x 3 years).    Eyes: Negative.  Negative for visual disturbance.   Respiratory: Negative.     Cardiovascular: Negative.    Gastrointestinal: Negative.    Endocrine: Negative.    Genitourinary: Negative.    Musculoskeletal: Negative.  Negative for gait problem.   Skin: Negative.    Allergic/Immunologic: Negative.    Neurological:  Negative.  Negative for dizziness, speech difficulty and headaches.        F/U  after ct head on 12/27/24   SDH    NoAC/Non smoker/No previous brain sx      Hematological: Negative.    Psychiatric/Behavioral: Negative.  Negative for sleep disturbance.    All other systems reviewed and are negative.   I have personally reviewed the MA's review of systems and made changes as necessary.    Pertinent Medical History   See discussion above    Medical History Reviewed by provider this encounter:     .  Past Medical History   Past Medical History:   Diagnosis Date    Cancer (HCC)     lymphoma    Cardiac disease     pacemaker    Hyperlipidemia     Hypertension      Past Surgical History:   Procedure Laterality Date    CARDIAC PACEMAKER PLACEMENT       History reviewed. No pertinent family history.   reports that he has never smoked. He has never used smokeless tobacco. He reports that he does not drink alcohol and does not use drugs.  Current Outpatient Medications on File Prior to Visit   Medication Sig Dispense Refill    atorvastatin (LIPITOR) 40 mg tablet TAKE 1 TABLET BY MOUTH EVERY DAY AT NIGHT      furosemide (LASIX) 20 mg tablet Take 1 tablet by mouth in the morning      lisinopril (ZESTRIL) 20 mg tablet Take 20 mg by mouth daily      metFORMIN (GLUCOPHAGE) 1000 MG tablet Take 1,000 mg by mouth 2 (two) times a day with meals      metoprolol succinate (TOPROL-XL) 25 mg 24 hr tablet Take 25 mg by mouth daily      montelukast (SINGULAIR) 10 mg tablet Take 10 mg by mouth daily at bedtime      fluticasone (FLONASE) 50 mcg/act nasal spray SPRAY 1 SQUIRT IN THE NOSTRILS TWICE A DAY (Patient not taking: Reported on 12/31/2024)      oxyCODONE (Roxicodone) 5 immediate release tablet Take 1 tablet (5 mg total) by mouth every 6 (six) hours as needed for moderate pain for up to 10 doses Max Daily Amount: 20 mg (Patient not taking: Reported on 11/24/2024) 10 tablet 0     No current facility-administered medications on file prior  "to visit.   No Known Allergies   Current Outpatient Medications on File Prior to Visit   Medication Sig Dispense Refill    atorvastatin (LIPITOR) 40 mg tablet TAKE 1 TABLET BY MOUTH EVERY DAY AT NIGHT      furosemide (LASIX) 20 mg tablet Take 1 tablet by mouth in the morning      lisinopril (ZESTRIL) 20 mg tablet Take 20 mg by mouth daily      metFORMIN (GLUCOPHAGE) 1000 MG tablet Take 1,000 mg by mouth 2 (two) times a day with meals      metoprolol succinate (TOPROL-XL) 25 mg 24 hr tablet Take 25 mg by mouth daily      montelukast (SINGULAIR) 10 mg tablet Take 10 mg by mouth daily at bedtime      fluticasone (FLONASE) 50 mcg/act nasal spray SPRAY 1 SQUIRT IN THE NOSTRILS TWICE A DAY (Patient not taking: Reported on 12/31/2024)      oxyCODONE (Roxicodone) 5 immediate release tablet Take 1 tablet (5 mg total) by mouth every 6 (six) hours as needed for moderate pain for up to 10 doses Max Daily Amount: 20 mg (Patient not taking: Reported on 11/24/2024) 10 tablet 0     No current facility-administered medications on file prior to visit.      Social History     Tobacco Use    Smoking status: Never    Smokeless tobacco: Never   Vaping Use    Vaping status: Never Used   Substance and Sexual Activity    Alcohol use: Never    Drug use: Never    Sexual activity: Not on file        Objective   /72 (Patient Position: Sitting, Cuff Size: Standard)   Pulse 81   Temp (!) 97.3 °F (36.3 °C) (Temporal)   Ht 5' 8\" (1.727 m)   Wt 58.1 kg (128 lb)   SpO2 98%   BMI 19.46 kg/m²     Physical Exam  Constitutional:       Appearance: Normal appearance.   HENT:      Head: Normocephalic and atraumatic.   Pulmonary:      Effort: Pulmonary effort is normal.   Musculoskeletal:         General: Normal range of motion.      Cervical back: Normal range of motion.   Neurological:      General: No focal deficit present.      Mental Status: He is alert and oriented to person, place, and time.      Cranial Nerves: Cranial nerves 2-12 are " intact.      Coordination: Finger-Nose-Finger Test normal.      Deep Tendon Reflexes:      Reflex Scores:       Tricep reflexes are 2+ on the right side and 2+ on the left side.       Bicep reflexes are 2+ on the right side and 2+ on the left side.       Brachioradialis reflexes are 2+ on the right side and 2+ on the left side.       Patellar reflexes are 2+ on the right side and 2+ on the left side.       Achilles reflexes are 2+ on the right side and 2+ on the left side.  Psychiatric:         Speech: Speech normal.       Neurologic Exam     Mental Status   Oriented to person, place, and time.   Speech: speech is normal   Level of consciousness: alert    Cranial Nerves   Cranial nerves II through XII intact.     Motor Exam   Muscle bulk: normal  Overall muscle tone: normal  Right arm tone: normal  Left arm tone: normal  Right arm pronator drift: absent  Left arm pronator drift: absent  Right leg tone: normal  Left leg tone: normal    Sensory Exam   Light touch normal.     Gait, Coordination, and Reflexes     Coordination   Finger to nose coordination: normal    Reflexes   Right brachioradialis: 2+  Left brachioradialis: 2+  Right biceps: 2+  Left biceps: 2+  Right triceps: 2+  Left triceps: 2+  Right patellar: 2+  Left patellar: 2+  Right achilles: 2+  Left achilles: 2+  Right : 2+  Left : 2+  Right Henriquez: absent  Left Henriquez: absent  Right ankle clonus: absent  Left ankle clonus: absent      SL AMB Radiology Results Review: I personally reviewed the following image studies in PACS and associated radiology reports: CT head. My interpretation of the radiology images/reports is: Images findings as described in the assessment section above.    Administrative Statements   I have spent a total time of 30 minutes in caring for this patient on the day of the visit/encounter including Diagnostic results, Prognosis, Risks and benefits of tx options, Instructions for management, Patient and family education,  Importance of tx compliance, Risk factor reductions, Impressions, Documenting in the medical record, Reviewing / ordering tests, medicine, procedures  , and Obtaining or reviewing history  .

## 2025-01-13 ENCOUNTER — HOSPITAL ENCOUNTER (OUTPATIENT)
Dept: CT IMAGING | Facility: CLINIC | Age: 75
Discharge: HOME | End: 2025-01-13
Payer: MEDICARE

## 2025-01-13 DIAGNOSIS — S06.5XAA SDH (SUBDURAL HEMATOMA) (HCC): ICD-10-CM

## 2025-01-13 PROCEDURE — 70450 CT HEAD/BRAIN W/O DYE: CPT

## 2025-01-16 ENCOUNTER — OFFICE VISIT (OUTPATIENT)
Dept: NEUROSURGERY | Facility: CLINIC | Age: 75
End: 2025-01-16
Payer: MEDICARE

## 2025-01-16 VITALS
RESPIRATION RATE: 16 BRPM | WEIGHT: 128 LBS | HEIGHT: 68 IN | HEART RATE: 64 BPM | BODY MASS INDEX: 19.4 KG/M2 | OXYGEN SATURATION: 96 % | TEMPERATURE: 97.8 F | SYSTOLIC BLOOD PRESSURE: 130 MMHG | DIASTOLIC BLOOD PRESSURE: 82 MMHG

## 2025-01-16 DIAGNOSIS — S06.5XAA SDH (SUBDURAL HEMATOMA) (HCC): Primary | ICD-10-CM

## 2025-01-16 PROCEDURE — 99213 OFFICE O/P EST LOW 20 MIN: CPT | Performed by: PHYSICIAN ASSISTANT

## 2025-01-16 NOTE — PROGRESS NOTES
Name: Kaia Burns      : 1950      MRN: [id number]  Encounter Provider: Juan Minaya PA-C  Encounter Date: 2025   Encounter department: [facility] [place]  :  Assessment & Plan    Patient is a 74 yrs old gentleman with PMHx of DM-2, S/P cardiac pacemaker placement in  on Eliquis and traumatic left tentoril Cerebelli SDH. Hx of fall about 6 to 7 weeks ago on Eliquis for his cardiac pacemaker.     Patient reports feeling better, denies headache, seizure, nausea, vomiting, vision issues, speech or swallowing problem. No weakness , numbness or paresthesia in the extremities. No gait problem or Bowel or bladder dysfunction.     Patient had follow up CT head and images shows No intracranial abnormality, previously reported left tentorial and right frontal subdural hematoma resolved.     Exam-Patient A&OX3, PERRL, EOMI 3 mm conj bilaterally Marily, Finger to nose test normal and without drift bilaterally. Sensation to LT intact throughout. DTR 2+, no clonus bilaterally.slight unstable gait uses cane for safety.     Imagings:      Findings as described above     Plan:    Patient feeling better image shows resolved intracranial hemorrhage, from neurosurgery perspective, no procedure or regular follow-up imaging is required.  Patient can restart his Eliquis  Advised for precaution  Call with question or concern otherwise follow-up on as needed basis.  All questions and concerns were answered to patient satisfaction.  Patient verbalized understanding's and agreed with the plan.    History of Present Illness   HPI-see detailed discussion above  Review of Systems   HENT:  Positive for tinnitus (right ear).    Eyes:  Negative for pain and visual disturbance.   Musculoskeletal:  Negative for gait problem.        Uses cane to ambulate    Neurological:  Negative for dizziness, speech difficulty, weakness, light-headedness, numbness and headaches.   Hematological:  Does not bruise/bleed  "easily.   All other systems reviewed and are negative.   I have personally reviewed the MA's review of systems and made changes as necessary.     Objective   Resp 16   Ht 5' 8\" (1.727 m)   Wt 58.1 kg (128 lb)   BMI 19.46 kg/m²     Physical Exam  Constitutional:       Appearance: Normal appearance.   HENT:      Head: Normocephalic and atraumatic.   Eyes:      General: Lids are normal.      Extraocular Movements: Extraocular movements intact.      Pupils: Pupils are equal, round, and reactive to light.   Pulmonary:      Effort: Pulmonary effort is normal.   Musculoskeletal:         General: Normal range of motion.      Cervical back: Normal range of motion.   Neurological:      General: No focal deficit present.      Mental Status: He is oriented to person, place, and time.      Motor: Motor strength is normal.     Deep Tendon Reflexes:      Reflex Scores:       Tricep reflexes are 2+ on the right side and 2+ on the left side.       Bicep reflexes are 2+ on the right side and 2+ on the left side.       Brachioradialis reflexes are 2+ on the right side and 2+ on the left side.       Patellar reflexes are 2+ on the right side and 2+ on the left side.       Achilles reflexes are 2+ on the right side and 2+ on the left side.  Psychiatric:         Speech: Speech normal.       Neurological Exam  Mental Status  Awake, alert and oriented to person, place and time. Oriented to person, place, time and situation. Oriented to person, place, and time. Recent and remote memory are intact. Speech is normal. Language is fluent with no aphasia. Attention and concentration are normal.    Cranial Nerves  CN II: Visual acuity is normal. Visual fields full to confrontation.  CN III, IV, VI: Extraocular movements intact bilaterally. Normal lids and orbits bilaterally. Pupils equal round and reactive to light bilaterally.  CN V: Facial sensation is normal.  CN VII: Full and symmetric facial movement.  CN VIII: Hearing is normal.  CN IX, " X: Palate elevates symmetrically. Normal gag reflex.  CN XI: Shoulder shrug strength is normal.  CN XII: Tongue midline without atrophy or fasciculations.    Motor  Normal muscle bulk throughout. Normal muscle tone. No abnormal involuntary movements. Strength is 5/5 throughout all four extremities.    Sensory  Light touch is normal in upper and lower extremities.     Reflexes                                            Right                      Left  Brachioradialis                    2+                         2+  Biceps                                 2+                         2+  Triceps                                2+                         2+  Finger flex                           2+                         2+  Hamstring                            2+                         2+  Patellar                                2+                         2+  Achilles                                2+                         2+    Coordination  Right: Finger-to-nose normal.    Gait    Slight baseline gait issues , uses cane .      Radiology Results Review: I personally reviewed the following image studies in PACS and associated radiology reports: CT head. My interpretation of the radiology images/reports is: I reviewed CT head image with the patient and findings as described above.    Administrative Statements   I have spent a total time of 30 minutes in caring for this patient on the day of the visit/encounter including Diagnostic results, Prognosis, Risks and benefits of tx options, Instructions for management, Patient and family education, Importance of tx compliance, Risk factor reductions, Impressions, Documenting in the medical record, Reviewing / ordering tests, medicine, procedures  , and Obtaining or reviewing history  .

## 2025-06-11 ENCOUNTER — APPOINTMENT (EMERGENCY)
Dept: RADIOLOGY | Facility: HOSPITAL | Age: 75
End: 2025-06-11
Payer: MEDICARE

## 2025-06-11 ENCOUNTER — HOSPITAL ENCOUNTER (EMERGENCY)
Facility: HOSPITAL | Age: 75
Discharge: HOME | End: 2025-06-11
Payer: MEDICARE

## 2025-06-11 VITALS
RESPIRATION RATE: 17 BRPM | HEART RATE: 60 BPM | DIASTOLIC BLOOD PRESSURE: 70 MMHG | BODY MASS INDEX: 45.61 KG/M2 | WEIGHT: 300 LBS | SYSTOLIC BLOOD PRESSURE: 147 MMHG | OXYGEN SATURATION: 95 % | TEMPERATURE: 98.4 F

## 2025-06-11 DIAGNOSIS — M77.8 RIGHT WRIST TENDINITIS: Primary | ICD-10-CM

## 2025-06-11 DIAGNOSIS — M25.531 ACUTE PAIN OF RIGHT WRIST: ICD-10-CM

## 2025-06-11 PROCEDURE — 99284 EMERGENCY DEPT VISIT MOD MDM: CPT | Performed by: PHYSICIAN ASSISTANT

## 2025-06-11 PROCEDURE — 99283 EMERGENCY DEPT VISIT LOW MDM: CPT

## 2025-06-11 PROCEDURE — 73110 X-RAY EXAM OF WRIST: CPT

## 2025-06-11 RX ORDER — PREDNISONE 10 MG/1
TABLET ORAL
Qty: 6 TABLET | Refills: 0 | Status: SHIPPED | OUTPATIENT
Start: 2025-06-11

## 2025-06-11 NOTE — ED PROVIDER NOTES
Time reflects when diagnosis was documented in both MDM as applicable and the Disposition within this note       Time User Action Codes Description Comment    6/11/2025  9:53 AM Juan Vang [M77.8] Right wrist tendinitis     6/11/2025  9:53 AM Juan Vang [M25.531] Acute pain of right wrist           ED Disposition       ED Disposition   Discharge    Condition   Stable    Date/Time   Wed Jun 11, 2025  9:52 AM    Comment   Kaia Davis Refcorinne discharge to home/self care.                   Assessment & Plan       Medical Decision Making  [facility] Narrative:  Clinical Impression:  75 y.o. male presents to ED for evaluation of right hand and wrist pain after twisting cap off bottle 1 week ago  Further details in HPI.    DDx - Based on my history and physical examination most likely diagnosis is tendinitis or tenosynovitis, also consider OA, RA, fracture, ligamentous or tendinous injury, cancer, carpal tunnel, cervical radiculopathy, or overuse injury.   Consider but doubt arterial occlusion, ischemic limb, compartment syndrome, osteomyelitis, or NSTI based on history and clinical presentation      Plan:  XR wrist to evaluate for fracture, reassess, consider splinting, anticipate discharge if xr negative.     On Reassessment: Xray interpretation negative.  No acute fracture or dislocation.  Discussed results at bedside.      Patient is stable.  vital signs are normal.  alert and oriented x 4, GCS 15.  Heart regular rate and rhythm, lungs clear to auscultation.  Abdominal exam reveals normal bowel sounds x 4, no tenderness. M/S MEADOWS x 4, no gross deformity, warm soft and well perfused. Remainder physical exam without acute abnormality.      Splint check: location right wrist,  Type dynamic pre-fabricated wrist splint, SILT, NVI, cap refill less than 3 seconds. Skin intact without redness or breakdown.  Splint applied by me.  Splint checked by me.      ED Summary and Disposition:  (see ED course for additional  "results and MDM):  Acute right wrist pain secondary to acute tendinitis.  Xrays negative.  Neurovascular motor intact with no signs of infection.   Patient is stable for discharge and outpatient management. Discussed diagnosis, treatment plan, and expectant coarse including Voltaren topical for pain, and steroid burst for tendinitis. Discussed use of wrist splint for support.  Provided verbal and written instructions to follow up with pcp and recommended specialist. Reviewed reasons to Return to ED.  Patient verbalized understanding of reasons return to the ED. Provided opportunity for questions.  All questions were answered.        Risk Assessment, Medical Decision Making and Disclaimers:     I reviewed the past medical, surgical, and social history, vital signs, nursing notes, and other relevant ancillary testing/information. I had a detailed discussion with the patient regarding the historical points, examination findings, and any diagnostic results    Patient was medically evaluated for potential limb- or life-threatening conditions.  History, clinical evaluation, and diagnostic results were used to narrow differential diagnosis and arrive at a final assessment.     Portions of the record may have been created with voice recognition software. Occasional wrong word or \"sound a like\" substitutions may have occurred due to the inherent limitations of voice recognition software. Read the chart carefully and recognize, using context, where substitutions have occurred.         Amount and/or Complexity of Data Reviewed  Radiology: ordered.    Risk  Prescription drug management.             Medications - No data to display    ED Risk Strat Scores                    No data recorded                            History of Present Illness       Chief Complaint   Patient presents with    Hand Pain     Right hand pain x 1 week after strain from twisting off a capped bottle        Past Medical History[1]   Past Surgical " History[2]   Family History[3]   Social History[4]   E-Cigarette/Vaping    E-Cigarette Use Never User       E-Cigarette/Vaping Substances      I have reviewed and agree with the history as documented.     75 y.o. male with past medical history significant for HTN, CAD and lymphoma presents to ED with chief complaint of right hand pain   Onset of symptoms reported as 1 week ago  Location of symptoms reported as base of right thumb radiating into wrist  Quality is reported as sharp constant pain, worse with movement  Severity is reported as moderate  Associated symptoms: denies fevers, chills, chest pain, SOB, joint swelling, weakness, paralysis, elbow pain, shoulder pain or rash.   Modifying factors: movement, specifically ulnar deviation, makes pain worse.     Context: patient is Right hand dominant.  Reports pain started 1 week ago after twisting off a capped bottle.  Has been persistent since.  Denies prior similar episodes in past.  Denies acute fall or trauma.            History provided by:  Patient   used: No    Hand Pain  Associated symptoms: no abdominal pain, no chest pain, no fever, no nausea, no rash, no shortness of breath and no vomiting        Review of Systems   Constitutional:  Negative for chills, diaphoresis, fever and unexpected weight change.   Eyes:  Negative for visual disturbance.   Respiratory:  Negative for chest tightness, shortness of breath and stridor.    Cardiovascular:  Negative for chest pain.   Gastrointestinal:  Negative for abdominal pain, nausea and vomiting.   Genitourinary:  Negative for decreased urine volume, difficulty urinating, hematuria and urgency.   Musculoskeletal:  Positive for arthralgias. Negative for gait problem.   Skin:  Negative for rash.   Neurological:  Negative for seizures, syncope, facial asymmetry, weakness and numbness.   All other systems reviewed and are negative.          Objective       ED Triage Vitals [06/11/25 0818]   Temperature  Pulse Blood Pressure Respirations SpO2 Patient Position - Orthostatic VS   98.4 °F (36.9 °C) 60 147/70 17 95 % Sitting      Temp Source Heart Rate Source BP Location FiO2 (%) Pain Score    Oral Monitor Left arm -- 8      Vitals      Date and Time Temp Pulse SpO2 Resp BP Pain Score FACES Pain Rating User   06/11/25 0820 -- -- 95 % -- -- -- -- JS   06/11/25 0818 98.4 °F (36.9 °C) 60 95 % 17 147/70 8 -- JS            Physical Exam  Vitals and nursing note reviewed.   Constitutional:       General: He is not in acute distress.     Appearance: Normal appearance.   HENT:      Head: Normocephalic and atraumatic.      Right Ear: External ear normal.      Left Ear: External ear normal.      Nose: Nose normal.     Eyes:      General: No scleral icterus.        Right eye: No discharge.         Left eye: No discharge.      Extraocular Movements: Extraocular movements intact.      Conjunctiva/sclera: Conjunctivae normal.       Cardiovascular:      Rate and Rhythm: Normal rate.      Pulses: Normal pulses.   Pulmonary:      Effort: Pulmonary effort is normal.      Breath sounds: Normal breath sounds.     Musculoskeletal:         General: Tenderness present. No deformity or signs of injury. Normal range of motion.      Right elbow: Normal.      Right wrist: Tenderness, bony tenderness and snuff box tenderness present. No swelling, deformity or effusion. Normal pulse.      Right hand: Normal.        Hands:       Cervical back: Normal range of motion and neck supple.     Skin:     General: Skin is dry.      Coloration: Skin is not jaundiced.      Findings: No erythema or rash.     Neurological:      General: No focal deficit present.      Mental Status: He is alert and oriented to person, place, and time. Mental status is at baseline.      Motor: No weakness.      Gait: Gait normal.     Psychiatric:         Mood and Affect: Mood normal.         Behavior: Behavior normal.         Thought Content: Thought content normal.          Results Reviewed       None            XR wrist 3+ views RIGHT   Final Interpretation by Lorne Goodwin MD (06/11 8654)      No acute osseous abnormality.         Computerized Assisted Algorithm (CAA) may have been used to analyze all applicable images.            Workstation performed: [id number]             Orthopedic injury treatment    Date/Time: 6/11/2025 9:45 AM    Performed by: Juan Vang PA-C  Authorized by: Juan Vang PA-C    Patient Location:  ED    [place] Protocol:  procedure performed by consultantConsent: Verbal consent obtained  Risks and benefits: risks, benefits and alternatives were discussed  Consent given by: patient  Patient identity confirmed: verbally with patient    Injury location:  Wrist  Location details:  Right wrist  Injury type:  Soft tissue  Neurovascular status: Neurovascularly intact    Distal perfusion: normal    Neurological function: normal    Range of motion: normal    Local anesthesia used?: No    General anesthesia used?: No    Skeletal traction used?: No    Immobilization:  Brace  Supplies used: prefabricated wrist splint.  Neurovascular status: Neurovascularly intact    Distal perfusion: normal    Neurological function: normal    Range of motion: unchanged    Patient tolerance:  Patient tolerated the procedure well with no immediate complications      ED Medication and Procedure Management   Prior to Admission Medications   Prescriptions Last Dose Informant Patient Reported? Taking?   atorvastatin (LIPITOR) 40 mg tablet  Self Yes No   Sig: TAKE 1 TABLET BY MOUTH EVERY DAY AT NIGHT   fluticasone (FLONASE) 50 mcg/act nasal spray  Self Yes No   Sig: SPRAY 1 SQUIRT IN THE NOSTRILS TWICE A DAY   Patient not taking: Reported on 12/31/2024   furosemide (LASIX) 20 mg tablet   Yes No   Sig: Take 1 tablet by mouth in the morning   lisinopril (ZESTRIL) 20 mg tablet  Self Yes No   Sig: Take 20 mg by mouth daily   metFORMIN (GLUCOPHAGE) 1000 MG tablet   Self Yes No   Sig: Take 1,000 mg by mouth 2 (two) times a day with meals   metoprolol succinate (TOPROL-XL) 25 mg 24 hr tablet  Self Yes No   Sig: Take 25 mg by mouth daily   montelukast (SINGULAIR) 10 mg tablet   Yes No   Sig: Take 10 mg by mouth daily at bedtime   oxyCODONE (Roxicodone) 5 immediate release tablet  Self No No   Sig: Take 1 tablet (5 mg total) by mouth every 6 (six) hours as needed for moderate pain for up to 10 doses Max Daily Amount: 20 mg   Patient not taking: Reported on 11/24/2024      Facility-Administered Medications: None     Discharge Medication List as of 6/11/2025  9:59 AM        START taking these medications    Details   Diclofenac Sodium (VOLTAREN) 1 % Apply 2 g topically 3 (three) times a day for 5 days, Starting Wed 6/11/2025, Until Mon 6/16/2025, Normal      predniSONE 10 mg tablet 20 mg po daily x 3 days then stop, Normal           CONTINUE these medications which have NOT CHANGED    Details   atorvastatin (LIPITOR) 40 mg tablet TAKE 1 TABLET BY MOUTH EVERY DAY AT NIGHT, Historical Med      fluticasone (FLONASE) 50 mcg/act nasal spray SPRAY 1 SQUIRT IN THE NOSTRILS TWICE A DAY, Historical Med      furosemide (LASIX) 20 mg tablet Take 1 tablet by mouth in the morning, Starting Thu 12/19/2024, Historical Med      lisinopril (ZESTRIL) 20 mg tablet Take 20 mg by mouth daily, Starting Sat 3/18/2023, Historical Med      metFORMIN (GLUCOPHAGE) 1000 MG tablet Take 1,000 mg by mouth 2 (two) times a day with meals, Starting Tue 2/14/2023, Historical Med      metoprolol succinate (TOPROL-XL) 25 mg 24 hr tablet Take 25 mg by mouth daily, Starting Wed 2/22/2023, Historical Med      montelukast (SINGULAIR) 10 mg tablet Take 10 mg by mouth daily at bedtime, Historical Med      oxyCODONE (Roxicodone) 5 immediate release tablet Take 1 tablet (5 mg total) by mouth every 6 (six) hours as needed for moderate pain for up to 10 doses Max Daily Amount: 20 mg, Starting Wed 3/1/2023, Normal           No  discharge procedures on file.  ED SEPSIS DOCUMENTATION   Time reflects when diagnosis was documented in both MDM as applicable and the Disposition within this note       Time User Action Codes Description Comment    6/11/2025  9:53 AM Juan Vang [M77.8] Right wrist tendinitis     6/11/2025  9:53 AM Juan Vang [M25.531] Acute pain of right wrist                      [1]   Past Medical History:  Diagnosis Date    Cancer (HCC)     lymphoma    Cardiac disease     pacemaker    Hyperlipidemia     Hypertension    [2]   Past Surgical History:  Procedure Laterality Date    CARDIAC PACEMAKER PLACEMENT     [3] No family history on file.  [4]   Social History  Tobacco Use    Smoking status: Never    Smokeless tobacco: Never   Vaping Use    Vaping status: Never Used   Substance Use Topics    Alcohol use: Never    Drug use: Never        Juan Vang PA-C  06/13/25 1032